# Patient Record
Sex: FEMALE | Race: BLACK OR AFRICAN AMERICAN | NOT HISPANIC OR LATINO | Employment: UNEMPLOYED | URBAN - METROPOLITAN AREA
[De-identification: names, ages, dates, MRNs, and addresses within clinical notes are randomized per-mention and may not be internally consistent; named-entity substitution may affect disease eponyms.]

---

## 2019-05-06 ENCOUNTER — HOSPITAL ENCOUNTER (OUTPATIENT)
Dept: RADIOLOGY | Facility: HOSPITAL | Age: 44
Discharge: HOME | End: 2019-05-06
Attending: FAMILY MEDICINE
Payer: COMMERCIAL

## 2019-05-06 ENCOUNTER — OFFICE VISIT (OUTPATIENT)
Dept: FAMILY MEDICINE | Facility: CLINIC | Age: 44
End: 2019-05-06
Payer: COMMERCIAL

## 2019-05-06 VITALS
DIASTOLIC BLOOD PRESSURE: 115 MMHG | WEIGHT: 254.44 LBS | TEMPERATURE: 99 F | BODY MASS INDEX: 39.93 KG/M2 | HEART RATE: 97 BPM | RESPIRATION RATE: 16 BRPM | SYSTOLIC BLOOD PRESSURE: 170 MMHG | OXYGEN SATURATION: 98 % | HEIGHT: 67 IN

## 2019-05-06 DIAGNOSIS — E66.9 OBESITY (BMI 30-39.9): ICD-10-CM

## 2019-05-06 DIAGNOSIS — R06.02 SHORTNESS OF BREATH: ICD-10-CM

## 2019-05-06 DIAGNOSIS — E04.2 MULTINODULAR GOITER: ICD-10-CM

## 2019-05-06 DIAGNOSIS — I10 HYPERTENSION, UNSPECIFIED TYPE: ICD-10-CM

## 2019-05-06 PROCEDURE — 3077F SYST BP >= 140 MM HG: CPT | Mod: CPTII,S$GLB,, | Performed by: FAMILY MEDICINE

## 2019-05-06 PROCEDURE — 3008F PR BODY MASS INDEX (BMI) DOCUMENTED: ICD-10-PCS | Mod: CPTII,S$GLB,, | Performed by: FAMILY MEDICINE

## 2019-05-06 PROCEDURE — 99204 OFFICE O/P NEW MOD 45 MIN: CPT | Mod: S$GLB,,, | Performed by: FAMILY MEDICINE

## 2019-05-06 PROCEDURE — 99999 PR PBB SHADOW E&M-NEW PATIENT-LVL IV: ICD-10-PCS | Mod: PBBFAC,,, | Performed by: FAMILY MEDICINE

## 2019-05-06 PROCEDURE — 3077F PR MOST RECENT SYSTOLIC BLOOD PRESSURE >= 140 MM HG: ICD-10-PCS | Mod: CPTII,S$GLB,, | Performed by: FAMILY MEDICINE

## 2019-05-06 PROCEDURE — 71046 X-RAY EXAM CHEST 2 VIEWS: CPT | Mod: 26,,, | Performed by: RADIOLOGY

## 2019-05-06 PROCEDURE — 99999 PR PBB SHADOW E&M-NEW PATIENT-LVL IV: CPT | Mod: PBBFAC,,, | Performed by: FAMILY MEDICINE

## 2019-05-06 PROCEDURE — 3008F BODY MASS INDEX DOCD: CPT | Mod: CPTII,S$GLB,, | Performed by: FAMILY MEDICINE

## 2019-05-06 PROCEDURE — 71046 X-RAY EXAM CHEST 2 VIEWS: CPT | Mod: TC,FY,PO

## 2019-05-06 PROCEDURE — 99204 PR OFFICE/OUTPT VISIT, NEW, LEVL IV, 45-59 MIN: ICD-10-PCS | Mod: S$GLB,,, | Performed by: FAMILY MEDICINE

## 2019-05-06 PROCEDURE — 71046 XR CHEST PA AND LATERAL: ICD-10-PCS | Mod: 26,,, | Performed by: RADIOLOGY

## 2019-05-06 PROCEDURE — 3080F PR MOST RECENT DIASTOLIC BLOOD PRESSURE >= 90 MM HG: ICD-10-PCS | Mod: CPTII,S$GLB,, | Performed by: FAMILY MEDICINE

## 2019-05-06 PROCEDURE — 3080F DIAST BP >= 90 MM HG: CPT | Mod: CPTII,S$GLB,, | Performed by: FAMILY MEDICINE

## 2019-05-06 RX ORDER — AMLODIPINE BESYLATE 5 MG/1
5 TABLET ORAL DAILY
Qty: 30 TABLET | Refills: 5 | Status: SHIPPED | OUTPATIENT
Start: 2019-05-06 | End: 2019-10-18 | Stop reason: SDUPTHER

## 2019-05-06 NOTE — PROGRESS NOTES
Arielle Paige    Chief Complaint   Patient presents with    [facility]    Mass     on neck       History of Present Illness:   Ms. Paige comes in today as a new patient to establish care with me and for follow-up of lump in her neck.      She saw Dr. Swain, endocrinologist with [facility], on April 23, 2019 for evaluation of lump in her neck at which time she had labs and thyroid ultrasound followed by follow-up visit with him on May 3, 2019 at which time he recommended thyroid removal.  She has been referred to general surgeon Dr. Garcia, whom she states she will see tomorrow.      She states prior to spring break she had flu like symptoms which resolved except reports having slight shortness of breath with activity with residual cough.  She reports possible catch with swallowing.    She reports no history of hypertension.  Her blood pressure at her initial visit with Dr. Swain was 150/90 with blood pressure of 120/80 at follow-up visit.  She states she does not add salt with cooking or with eating.  She states she exercises at [place].  She reports having slight headache without headache today.     Otherwise, she denies having numbness, acute visual changes; chest pain, palpitations, leg swelling; wheezing; abdominal pain, nausea, vomiting, diarrhea, constipation; hot or cold intolerance; fever, chills, fatigue, appetite changes; unusual urinary symptoms; back pain; anxiety, depression, homicidal or suicidal thoughts.    Ultrasound, thyroid - April 29, 2019 - enlarged multinodular thyroid gland.  Right lobe measures 7.2 x 3.9 x 4.4 cm.  Left lobe measures 6.5 x 1.7 x 2.4 cm.  There are numerous solid heterogeneous hyperechoic and isoechoic nodules scattered throughout the bilateral thyroid gland, several of which demonstrate small internal cysts.  The largest nodule occupying the mid and lower right thyroid lobe measures 6.0 x 5.6 x 3.3 cm.  Another dominant nodule in the superior  right lobe measures 4.2 cm.  The largest left thyroid nodule in the inferior pole measures 2.5 x 1.5 cm.  The remaining left thyroid nodules range in size from 1.0 cm to 1.6 cm.    Labs (drawn on April 23, 2019):  TSH 1.86, total cholesterol 185, triglycerides 69, HDL 51, , WBC 7.9, hemoglobin 13.1, hematocrit 40.0, platelet count [id number], thyroid peroxidase antibody 19.2, ALT 18, AST 13, glucose 94, BUN 10, creatinine 0.74, total bilirubin 0.3, alkaline phosphatase 61, calcium 9.8, sodium 140, potassium 4.7, chloride 105, CO2 26, total protein 6.5.      Current Outpatient Medications   Medication Sig    norethindrone-e.estradiol-iron (LO LOESTRIN FE) 1 mg-10 mcg (24)/10 mcg (2) Tab Take 1 tablet by mouth.       Review of Systems   Constitutional: Negative for appetite change, chills, fatigue and fever.   HENT: Negative for trouble swallowing.    Respiratory: Positive for shortness of breath. Negative for cough and wheezing.    Cardiovascular: Negative for chest pain, palpitations and leg swelling.   Gastrointestinal: Negative for abdominal pain, constipation, diarrhea, nausea and vomiting.   Endocrine: Negative for cold intolerance and heat intolerance.   Genitourinary: Negative for difficulty urinating.   Musculoskeletal: Negative for back pain.   Neurological: Positive for headaches.   Psychiatric/Behavioral: Negative for dysphoric mood and suicidal ideas. The patient is not nervous/anxious.        Objective:  Physical Exam   Constitutional: She is oriented to person, place, and time. She appears well-developed and well-nourished. No distress.   Pleasant.   Eyes:   She wears glasses.   Neck: Normal range of motion. Neck supple. Thyromegaly present.   Non tender right > left.   Cardiovascular: Normal rate, regular rhythm, normal heart sounds and intact distal pulses.   No murmur heard.  Pulmonary/Chest: Effort normal and breath sounds normal. No stridor. No respiratory distress. She has no wheezes.    Abdominal: Soft. Bowel sounds are normal. She exhibits no distension and no mass. There is no tenderness. There is no guarding.   Musculoskeletal: Normal range of motion. She exhibits no edema or tenderness.   She is ambulatory without problems.   Lymphadenopathy:     She has no cervical adenopathy.   Neurological: She is alert and oriented to person, place, and time.   Skin: She is not diaphoretic.   Psychiatric: She has a normal mood and affect. Her behavior is normal. Judgment and thought content normal.   Vitals reviewed.      ASSESSMENT:  1. Hypertension, unspecified type    2. Shortness of breath    3. Multinodular goiter    4. Obesity (BMI 30-39.9)        PLAN:  Arielle was seen today for [facility] care and mass.    Diagnoses and all orders for this visit:    Hypertension, unspecified type  -     amLODIPine (NORVASC) 5 MG tablet; Take 1 tablet (5 mg total) by mouth once daily.    Shortness of breath  -     X-Ray Chest PA And Lateral; Future    Multinodular goiter    Obesity (BMI 30-39.9)       Patient advised to call for results.  Add Amlodipine 5 mg daily; medication precautions discussed with patient.  Continue current medications, follow low sodium, low cholesterol, low carb diet, daily walks.  Perform home blood pressure monitoring once week and keep record for my review (blood pressure goal < 140/90).  Keep follow up with specialists.  Follow up in about 8 weeks (around 7/1/2019) for hypertension follow up.

## 2019-05-13 PROBLEM — I10 HYPERTENSION: Status: ACTIVE | Noted: 2019-05-13

## 2019-05-13 PROBLEM — E04.2 MULTINODULAR GOITER: Status: ACTIVE | Noted: 2019-05-13

## 2019-07-01 ENCOUNTER — OFFICE VISIT (OUTPATIENT)
Dept: FAMILY MEDICINE | Facility: CLINIC | Age: 44
End: 2019-07-01
Payer: COMMERCIAL

## 2019-07-01 VITALS
OXYGEN SATURATION: 98 % | WEIGHT: 253.5 LBS | DIASTOLIC BLOOD PRESSURE: 90 MMHG | BODY MASS INDEX: 39.79 KG/M2 | TEMPERATURE: 98 F | SYSTOLIC BLOOD PRESSURE: 130 MMHG | HEIGHT: 67 IN | HEART RATE: 83 BPM

## 2019-07-01 DIAGNOSIS — I10 HYPERTENSION, UNSPECIFIED TYPE: Primary | ICD-10-CM

## 2019-07-01 DIAGNOSIS — E89.0 S/P THYROIDECTOMY: ICD-10-CM

## 2019-07-01 DIAGNOSIS — E66.9 OBESITY (BMI 30-39.9): ICD-10-CM

## 2019-07-01 PROBLEM — Z98.890 S/P THYROIDECTOMY: Status: ACTIVE | Noted: 2019-07-01

## 2019-07-01 PROBLEM — Z90.89 S/P THYROIDECTOMY: Status: ACTIVE | Noted: 2019-07-01

## 2019-07-01 PROCEDURE — 3008F BODY MASS INDEX DOCD: CPT | Mod: CPTII,S$GLB,, | Performed by: FAMILY MEDICINE

## 2019-07-01 PROCEDURE — 3075F SYST BP GE 130 - 139MM HG: CPT | Mod: CPTII,S$GLB,, | Performed by: FAMILY MEDICINE

## 2019-07-01 PROCEDURE — 99214 PR OFFICE/OUTPT VISIT, EST, LEVL IV, 30-39 MIN: ICD-10-PCS | Mod: S$GLB,,, | Performed by: FAMILY MEDICINE

## 2019-07-01 PROCEDURE — 3080F PR MOST RECENT DIASTOLIC BLOOD PRESSURE >= 90 MM HG: ICD-10-PCS | Mod: CPTII,S$GLB,, | Performed by: FAMILY MEDICINE

## 2019-07-01 PROCEDURE — 3080F DIAST BP >= 90 MM HG: CPT | Mod: CPTII,S$GLB,, | Performed by: FAMILY MEDICINE

## 2019-07-01 PROCEDURE — 99999 PR PBB SHADOW E&M-EST. PATIENT-LVL IV: CPT | Mod: PBBFAC,,, | Performed by: FAMILY MEDICINE

## 2019-07-01 PROCEDURE — 99214 OFFICE O/P EST MOD 30 MIN: CPT | Mod: S$GLB,,, | Performed by: FAMILY MEDICINE

## 2019-07-01 PROCEDURE — 99999 PR PBB SHADOW E&M-EST. PATIENT-LVL IV: ICD-10-PCS | Mod: PBBFAC,,, | Performed by: FAMILY MEDICINE

## 2019-07-01 PROCEDURE — 3075F PR MOST RECENT SYSTOLIC BLOOD PRESS GE 130-139MM HG: ICD-10-PCS | Mod: CPTII,S$GLB,, | Performed by: FAMILY MEDICINE

## 2019-07-01 PROCEDURE — 3008F PR BODY MASS INDEX (BMI) DOCUMENTED: ICD-10-PCS | Mod: CPTII,S$GLB,, | Performed by: FAMILY MEDICINE

## 2019-07-01 RX ORDER — HYDROCHLOROTHIAZIDE 12.5 MG/1
12.5 TABLET ORAL DAILY
Qty: 30 TABLET | Refills: 5 | Status: SHIPPED | OUTPATIENT
Start: 2019-07-01 | End: 2019-12-17 | Stop reason: SDUPTHER

## 2019-07-01 RX ORDER — LEVOTHYROXINE SODIUM 150 UG/1
150 TABLET ORAL DAILY
COMMUNITY
End: 2020-01-02 | Stop reason: DRUGHIGH

## 2019-07-01 NOTE — PROGRESS NOTES
Arielle Paige    Chief Complaint   Patient presents with    Hypertension    Follow-up       History of Present Illness:   Ms. Paige comes in today for hypertension follow-up.  She has taken medication today.  She states she monitors her diet but has not started exercise following thyroidectomy performed on Arelis 15, 2019 for treatment of multinodular goiter.  She reports no problems with amlodipine 5 mg daily prescribed on May 6, 2019 for treatment of hypertension.  She reports home blood pressure levels ranged 132/98.    As stated above, she is status post total thyroidectomy for treatment of multinodular goiter.  She saw Dr. Reji Garcia, general surgeon, for postsurgical follow-up on June 24, 2019 and is scheduled for follow up on July 9, 2019.  She states he told her to take Tums as she states she was told 1 of her parathyroid glands was also removed.  She states she continues to have slight tenderness at the surgical area at her neck but denies having pain with swallowing or sore throat.  She follows with Dr. Swain, endocrinologist, for surgical hypothyroidism surveillance and is currently on Synthroid 150 mcg daily.  She saw him on May 2, 2019 and is scheduled for follow-up with labs with Dr. Swain on July 16, 2019.    Otherwise, she denies having fever, chills, fatigue, appetite changes; shortness of breath, cough, wheezing; chest pain, palpitations, leg swelling; abdominal pain, nausea, vomiting, diarrhea, constipation; unusual urinary symptoms; back pain; headache; anxiety, depression, homicidal or suicidal thoughts.      Current Outpatient Medications   Medication Sig    amLODIPine (NORVASC) 5 MG tablet Take 1 tablet (5 mg total) by mouth once daily.    levothyroxine (SYNTHROID) 150 MCG tablet Take 150 mcg by mouth once daily.    norethindrone-e.estradiol-iron (LO LOESTRIN FE) 1 mg-10 mcg (24)/10 mcg (2) Tab Take 1 tablet by mouth.       Review of Systems   Constitutional: Negative for  appetite change, chills, fatigue and fever.        Weight 115.4 kg (254 lb 6.6 oz) at May 6, 2019 visit.   HENT: Negative for trouble swallowing.    Respiratory: Negative for cough, shortness of breath and wheezing.    Cardiovascular: Negative for chest pain, palpitations and leg swelling.   Gastrointestinal: Negative for abdominal pain, constipation, diarrhea, nausea and vomiting.   Endocrine: Negative for cold intolerance and heat intolerance.        See history of present illness.   Genitourinary: Negative for difficulty urinating.   Musculoskeletal: Negative for back pain and neck pain.   Neurological: Negative for headaches.   Psychiatric/Behavioral: Negative for dysphoric mood and suicidal ideas. The patient is not nervous/anxious.        Objective:  Physical Exam   Constitutional: She is oriented to person, place, and time. She appears well-developed and well-nourished. No distress.   Pleasant.   Eyes:   She wears glasses.   Neck: Normal range of motion. Neck supple. No thyromegaly present.   Thyroid absent.   Cardiovascular: Normal rate, regular rhythm, normal heart sounds and intact distal pulses.   No murmur heard.  Pulmonary/Chest: Effort normal and breath sounds normal. No stridor. No respiratory distress. She has no wheezes.   Abdominal: Soft. Bowel sounds are normal. She exhibits no distension and no mass. There is no tenderness. There is no guarding.   Musculoskeletal: Normal range of motion. She exhibits no edema or tenderness.   She is ambulatory without problems.   Lymphadenopathy:     She has no cervical adenopathy.   Neurological: She is alert and oriented to person, place, and time.   Skin: She is not diaphoretic.   Healing wound at anterior neck.   Psychiatric: She has a normal mood and affect. Her behavior is normal. Judgment and thought content normal.   Vitals reviewed.      ASSESSMENT:  1. Hypertension, unspecified type    2. S/P thyroidectomy    3. Obesity (BMI 30-39.9)         PLAN:  Arielle was seen today for hypertension and follow-up.    Diagnoses and all orders for this visit:    Hypertension, unspecified type  -     hydroCHLOROthiazide (HYDRODIURIL) 12.5 MG Tab; Take 1 tablet (12.5 mg total) by mouth once daily.    S/P thyroidectomy    Obesity (BMI 30-39.9)       Patient advised to call for results.  Add HCTZ 12.5 mg daily for better blood pressure control; medication precautions discussed with patient.  Continue current medications, follow low sodium, low cholesterol, low carb diet, daily walks.  Keep follow up with specialists.  Follow up in about 6 months (around 1/2/2020) for hypertension follow up.

## 2019-09-17 ENCOUNTER — TELEPHONE (OUTPATIENT)
Dept: FAMILY MEDICINE | Facility: CLINIC | Age: 44
End: 2019-09-17

## 2019-09-17 DIAGNOSIS — Z23 NEED FOR TDAP VACCINATION: Primary | ICD-10-CM

## 2019-09-17 DIAGNOSIS — Z23 NEED FOR INFLUENZA VACCINATION: ICD-10-CM

## 2019-09-17 DIAGNOSIS — Z11.1 SCREENING-PULMONARY TB: Primary | ICD-10-CM

## 2019-09-17 DIAGNOSIS — Z11.59 SCREENING FOR VIRAL DISEASE: ICD-10-CM

## 2019-09-17 DIAGNOSIS — Z11.59 SCREENING EXAMINATION FOR RUBELLA: ICD-10-CM

## 2019-09-17 DIAGNOSIS — Z11.59 MEASLES SCREENING: ICD-10-CM

## 2019-09-17 NOTE — TELEPHONE ENCOUNTER
I spoke w/pt - needs flu shot, 2-step TB skin testing and labs (titers-MMR, Tdap) for work.  Please schedule nurse visit for tomorrow 9/18/19 - [facility] - 8-830 nurse visit for flu shot, TB skin test, then 2nd TB skin test on 9/25/19-[facility] 8-830 as nurse visit with reading on 9/27/19 - FRI as nurse visit. Patient aware. Thanks.

## 2019-09-18 ENCOUNTER — CLINICAL SUPPORT (OUTPATIENT)
Dept: FAMILY MEDICINE | Facility: CLINIC | Age: 44
End: 2019-09-18
Payer: COMMERCIAL

## 2019-09-18 ENCOUNTER — LAB VISIT (OUTPATIENT)
Dept: LAB | Facility: HOSPITAL | Age: 44
End: 2019-09-18
Attending: FAMILY MEDICINE
Payer: COMMERCIAL

## 2019-09-18 DIAGNOSIS — Z11.59 MEASLES SCREENING: ICD-10-CM

## 2019-09-18 DIAGNOSIS — Z11.59 SCREENING EXAMINATION FOR RUBELLA: ICD-10-CM

## 2019-09-18 DIAGNOSIS — Z11.59 SCREENING FOR VIRAL DISEASE: ICD-10-CM

## 2019-09-18 PROCEDURE — 90471 FLU VACCINE (QUAD) GREATER THAN OR EQUAL TO 3YO PRESERVATIVE FREE IM: ICD-10-PCS | Mod: S$GLB,,, | Performed by: FAMILY MEDICINE

## 2019-09-18 PROCEDURE — 90471 IMMUNIZATION ADMIN: CPT | Mod: S$GLB,,, | Performed by: FAMILY MEDICINE

## 2019-09-18 PROCEDURE — 90715 TDAP VACCINE 7 YRS/> IM: CPT | Mod: S$GLB,,, | Performed by: FAMILY MEDICINE

## 2019-09-18 PROCEDURE — 90715 TDAP VACCINE GREATER THAN OR EQUAL TO 7YO IM: ICD-10-PCS | Mod: S$GLB,,, | Performed by: FAMILY MEDICINE

## 2019-09-18 PROCEDURE — 90686 IIV4 VACC NO PRSV 0.5 ML IM: CPT | Mod: S$GLB,,, | Performed by: FAMILY MEDICINE

## 2019-09-18 PROCEDURE — 86765 RUBEOLA ANTIBODY: CPT

## 2019-09-18 PROCEDURE — 99999 PR PBB SHADOW E&M-EST. PATIENT-LVL I: CPT | Mod: PBBFAC,,,

## 2019-09-18 PROCEDURE — 90472 TDAP VACCINE GREATER THAN OR EQUAL TO 7YO IM: ICD-10-PCS | Mod: S$GLB,,, | Performed by: FAMILY MEDICINE

## 2019-09-18 PROCEDURE — 86762 RUBELLA ANTIBODY: CPT

## 2019-09-18 PROCEDURE — 86735 MUMPS ANTIBODY: CPT

## 2019-09-18 PROCEDURE — 86580 POCT TB SKIN TEST: ICD-10-PCS | Mod: S$GLB,,, | Performed by: FAMILY MEDICINE

## 2019-09-18 PROCEDURE — 86580 TB INTRADERMAL TEST: CPT | Mod: S$GLB,,, | Performed by: FAMILY MEDICINE

## 2019-09-18 PROCEDURE — 99999 PR PBB SHADOW E&M-EST. PATIENT-LVL I: ICD-10-PCS | Mod: PBBFAC,,,

## 2019-09-18 PROCEDURE — 90472 IMMUNIZATION ADMIN EACH ADD: CPT | Mod: S$GLB,,, | Performed by: FAMILY MEDICINE

## 2019-09-18 PROCEDURE — 90686 FLU VACCINE (QUAD) GREATER THAN OR EQUAL TO 3YO PRESERVATIVE FREE IM: ICD-10-PCS | Mod: S$GLB,,, | Performed by: FAMILY MEDICINE

## 2019-09-18 PROCEDURE — 36415 COLL VENOUS BLD VENIPUNCTURE: CPT | Mod: PO

## 2019-09-19 LAB
RUBV IGG SER-ACNC: 15.6 IU/ML
RUBV IGG SER-IMP: REACTIVE

## 2019-09-19 NOTE — PROGRESS NOTES
Pt connor inj well  Tb skin test to r arm   Pt will return for reading on Friday evening  Verbalized understanding

## 2019-09-20 ENCOUNTER — CLINICAL SUPPORT (OUTPATIENT)
Dept: FAMILY MEDICINE | Facility: CLINIC | Age: 44
End: 2019-09-20
Payer: COMMERCIAL

## 2019-09-20 LAB
TB INDURATION - 48 HR READ: 0 MM
TB INDURATION - 72 HR READ: NORMAL MM
TB SKIN TEST - 48 HR READ: NEGATIVE
TB SKIN TEST - 72 HR READ: NORMAL

## 2019-09-23 LAB
MUMPS IGG INTERPRETATION: POSITIVE
MUMPS IGG SCREEN: 1.88 ISR (ref 0–0.9)
RUBEOLA IGG ANTIBODY: 2.12 ISR (ref 0–0.9)
RUBEOLA INTERPRETATION: POSITIVE

## 2019-09-30 ENCOUNTER — CLINICAL SUPPORT (OUTPATIENT)
Dept: FAMILY MEDICINE | Facility: CLINIC | Age: 44
End: 2019-09-30
Payer: COMMERCIAL

## 2019-09-30 DIAGNOSIS — Z11.1 SCREENING-PULMONARY TB: Primary | ICD-10-CM

## 2019-09-30 PROCEDURE — 86580 POCT TB SKIN TEST: ICD-10-PCS | Mod: S$GLB,,, | Performed by: FAMILY MEDICINE

## 2019-09-30 PROCEDURE — 99999 PR PBB SHADOW E&M-EST. PATIENT-LVL I: CPT | Mod: PBBFAC,,,

## 2019-09-30 PROCEDURE — 99999 PR PBB SHADOW E&M-EST. PATIENT-LVL I: ICD-10-PCS | Mod: PBBFAC,,,

## 2019-09-30 PROCEDURE — 86580 TB INTRADERMAL TEST: CPT | Mod: S$GLB,,, | Performed by: FAMILY MEDICINE

## 2019-09-30 NOTE — TELEPHONE ENCOUNTER
Order in. For clarification: needs PPD today with reading on this Wednesday; if  (-), then PPD placement again next Monday with reading the following Wednesday. Thanks.

## 2019-10-02 ENCOUNTER — CLINICAL SUPPORT (OUTPATIENT)
Dept: FAMILY MEDICINE | Facility: CLINIC | Age: 44
End: 2019-10-02
Payer: COMMERCIAL

## 2019-10-02 PROCEDURE — 99999 PR PBB SHADOW E&M-EST. PATIENT-LVL I: ICD-10-PCS | Mod: PBBFAC,,,

## 2019-10-02 PROCEDURE — 99999 PR PBB SHADOW E&M-EST. PATIENT-LVL I: CPT | Mod: PBBFAC,,,

## 2019-10-02 NOTE — PROGRESS NOTES
PPD Test read negative on right forearm. Patient scheduled for nurse visit for 2nd ppd test on Monday/vlw

## 2019-10-07 ENCOUNTER — CLINICAL SUPPORT (OUTPATIENT)
Dept: FAMILY MEDICINE | Facility: CLINIC | Age: 44
End: 2019-10-07
Payer: COMMERCIAL

## 2019-10-07 DIAGNOSIS — Z11.1 SCREENING-PULMONARY TB: Primary | ICD-10-CM

## 2019-10-07 PROCEDURE — 86580 TB INTRADERMAL TEST: CPT | Mod: S$GLB,,, | Performed by: FAMILY MEDICINE

## 2019-10-07 PROCEDURE — 99999 PR PBB SHADOW E&M-EST. PATIENT-LVL I: ICD-10-PCS | Mod: PBBFAC,,,

## 2019-10-07 PROCEDURE — 86580 PR  TB INTRADERMAL TEST: ICD-10-PCS | Mod: S$GLB,,, | Performed by: FAMILY MEDICINE

## 2019-10-07 PROCEDURE — 99999 PR PBB SHADOW E&M-EST. PATIENT-LVL I: CPT | Mod: PBBFAC,,,

## 2019-10-07 NOTE — PROGRESS NOTES
PPD adminstered as ordered by   Patient infromed PPD today with reading on this Wednesday at 9:01am or anytime during this day; Patient voiced understanding

## 2019-10-09 ENCOUNTER — CLINICAL SUPPORT (OUTPATIENT)
Dept: FAMILY MEDICINE | Facility: CLINIC | Age: 44
End: 2019-10-09
Payer: COMMERCIAL

## 2019-10-09 DIAGNOSIS — Z11.1 SCREENING-PULMONARY TB: Primary | ICD-10-CM

## 2019-10-09 LAB
TB INDURATION - 48 HR READ: 0 MM
TB INDURATION - 72 HR READ: NORMAL
TB SKIN TEST - 48 HR READ: NEGATIVE
TB SKIN TEST - 72 HR READ: NORMAL

## 2019-10-18 DIAGNOSIS — I10 HYPERTENSION, UNSPECIFIED TYPE: ICD-10-CM

## 2019-10-18 RX ORDER — AMLODIPINE BESYLATE 5 MG/1
TABLET ORAL
Qty: 30 TABLET | Refills: 5 | Status: SHIPPED | OUTPATIENT
Start: 2019-10-18 | End: 2020-01-02 | Stop reason: SDUPTHER

## 2019-12-17 DIAGNOSIS — I10 HYPERTENSION, UNSPECIFIED TYPE: ICD-10-CM

## 2019-12-17 RX ORDER — HYDROCHLOROTHIAZIDE 12.5 MG/1
TABLET ORAL
Qty: 30 TABLET | Refills: 5 | Status: SHIPPED | OUTPATIENT
Start: 2019-12-17 | End: 2020-01-02 | Stop reason: SDUPTHER

## 2020-01-02 ENCOUNTER — OFFICE VISIT (OUTPATIENT)
Dept: FAMILY MEDICINE | Facility: CLINIC | Age: 45
End: 2020-01-02
Payer: COMMERCIAL

## 2020-01-02 VITALS
TEMPERATURE: 98 F | OXYGEN SATURATION: 97 % | HEART RATE: 90 BPM | BODY MASS INDEX: 43.23 KG/M2 | SYSTOLIC BLOOD PRESSURE: 130 MMHG | WEIGHT: 275.44 LBS | HEIGHT: 67 IN | DIASTOLIC BLOOD PRESSURE: 80 MMHG

## 2020-01-02 DIAGNOSIS — E89.0 S/P THYROIDECTOMY: ICD-10-CM

## 2020-01-02 DIAGNOSIS — M79.646 PAIN OF FINGER, UNSPECIFIED LATERALITY: ICD-10-CM

## 2020-01-02 DIAGNOSIS — I10 HYPERTENSION, UNSPECIFIED TYPE: ICD-10-CM

## 2020-01-02 DIAGNOSIS — E66.01 MORBIDLY OBESE: ICD-10-CM

## 2020-01-02 PROBLEM — E66.9 OBESITY (BMI 30-39.9): Status: RESOLVED | Noted: 2019-07-01 | Resolved: 2020-01-02

## 2020-01-02 PROCEDURE — 99999 PR PBB SHADOW E&M-EST. PATIENT-LVL III: CPT | Mod: PBBFAC,,, | Performed by: FAMILY MEDICINE

## 2020-01-02 PROCEDURE — 3079F DIAST BP 80-89 MM HG: CPT | Mod: CPTII,S$GLB,, | Performed by: FAMILY MEDICINE

## 2020-01-02 PROCEDURE — 3075F PR MOST RECENT SYSTOLIC BLOOD PRESS GE 130-139MM HG: ICD-10-PCS | Mod: CPTII,S$GLB,, | Performed by: FAMILY MEDICINE

## 2020-01-02 PROCEDURE — 3075F SYST BP GE 130 - 139MM HG: CPT | Mod: CPTII,S$GLB,, | Performed by: FAMILY MEDICINE

## 2020-01-02 PROCEDURE — 3008F BODY MASS INDEX DOCD: CPT | Mod: CPTII,S$GLB,, | Performed by: FAMILY MEDICINE

## 2020-01-02 PROCEDURE — 99214 OFFICE O/P EST MOD 30 MIN: CPT | Mod: S$GLB,,, | Performed by: FAMILY MEDICINE

## 2020-01-02 PROCEDURE — 99214 PR OFFICE/OUTPT VISIT, EST, LEVL IV, 30-39 MIN: ICD-10-PCS | Mod: S$GLB,,, | Performed by: FAMILY MEDICINE

## 2020-01-02 PROCEDURE — 3008F PR BODY MASS INDEX (BMI) DOCUMENTED: ICD-10-PCS | Mod: CPTII,S$GLB,, | Performed by: FAMILY MEDICINE

## 2020-01-02 PROCEDURE — 3079F PR MOST RECENT DIASTOLIC BLOOD PRESSURE 80-89 MM HG: ICD-10-PCS | Mod: CPTII,S$GLB,, | Performed by: FAMILY MEDICINE

## 2020-01-02 PROCEDURE — 99999 PR PBB SHADOW E&M-EST. PATIENT-LVL III: ICD-10-PCS | Mod: PBBFAC,,, | Performed by: FAMILY MEDICINE

## 2020-01-02 RX ORDER — LEVOTHYROXINE SODIUM 125 UG/1
125 TABLET ORAL DAILY
COMMUNITY
End: 2021-01-12

## 2020-01-02 RX ORDER — AMLODIPINE BESYLATE 5 MG/1
5 TABLET ORAL DAILY
Qty: 30 TABLET | Refills: 5 | Status: SHIPPED | OUTPATIENT
Start: 2020-01-02 | End: 2020-07-06 | Stop reason: SDUPTHER

## 2020-01-02 RX ORDER — HYDROCHLOROTHIAZIDE 12.5 MG/1
12.5 TABLET ORAL DAILY
Qty: 30 TABLET | Refills: 5 | Status: SHIPPED | OUTPATIENT
Start: 2020-01-02 | End: 2020-04-11 | Stop reason: SDUPTHER

## 2020-01-02 NOTE — PROGRESS NOTES
Arielle Paige    Chief Complaint   Patient presents with    Hypertension    Follow-up       History of Present Illness:   Ms. Paige comes in today for hypertension follow-up.  She has taken medication and is fasting today.  She states she monitors her diet and usually exercises.  She reports no problems with taking blood pressure medications and states home blood pressure levels are good.    She states some time ago feeling possible palpitations and thinks she may have been anxious at the time but denies having other associated symptoms during that time.  She states she drinks very little caffeine.  She states she has not experienced palpitations in at  least 2 weeks.    She is status post total thyroidectomy for treatment of multinodular goiter.  She saw Dr. Reji Garcia, general surgeon, for postsurgical follow-up on uly 9, 2019.  She follows with Dr. Swain, endocrinologist, for surgical hypothyroidism surveillance and is currently on Synthroid 125 mcg daily.  She saw him on July 16, 2019 and is scheduled to see him for follow up on January 15. 2020.    She is right handed.  She states some times she has pain with occasional swelling of her right index (PIP > DIP) joints and at left wrist without associated trauma, warmth, redness or decreased range of motion x several months.  She states she does not take or require pain medication.      Otherwise, she denies having fever, chills, fatigue, appetite changes; shortness of breath, cough, wheezing; chest pain, leg swelling; abdominal pain, nausea, vomiting, diarrhea, constipation; unusual urinary symptoms; back pain; headache; anxiety, depression, homicidal or suicidal thoughts; hot or cold intolerance.            Current Outpatient Medications   Medication Sig    amLODIPine (NORVASC) 5 MG tablet TAKE 1 TABLET BY MOUTH EVERY DAY    hydroCHLOROthiazide (HYDRODIURIL) 12.5 MG Tab TAKE 1 TABLET BY MOUTH EVERY DAY    levothyroxine (SYNTHROID) 125 MCG  tablet Take 125 mcg by mouth once daily.    norethindrone-e.estradiol-iron (LO LOESTRIN FE) 1 mg-10 mcg (24)/10 mcg (2) Tab Take 1 tablet by mouth.       Review of Systems   Constitutional: Negative for activity change, appetite change, chills, fatigue and fever.        Weight 115 kg (253 lb 8.5 oz) at July 1, 2019 visit.   Respiratory: Negative for cough, shortness of breath and wheezing.    Cardiovascular: Positive for palpitations. Negative for chest pain and leg swelling.   Gastrointestinal: Negative for abdominal pain, constipation, diarrhea, nausea and vomiting.   Endocrine: Negative for cold intolerance and heat intolerance.        See history of present illness.   Genitourinary: Negative for difficulty urinating.   Musculoskeletal: Positive for joint swelling and myalgias. Negative for back pain.   Neurological: Negative for headaches.   Psychiatric/Behavioral: Negative for dysphoric mood and suicidal ideas. The patient is not nervous/anxious.        Objective:  Physical Exam   Constitutional: She is oriented to person, place, and time. She appears well-developed and well-nourished. No distress.   Pleasant.   Eyes:   She wears glasses.   Neck: Normal range of motion. Neck supple. No thyromegaly present.   Thyroid absent.   Cardiovascular: Normal rate, regular rhythm, normal heart sounds and intact distal pulses.   No murmur heard.  Pulmonary/Chest: Effort normal and breath sounds normal. No stridor. No respiratory distress. She has no wheezes.   Abdominal: Soft. Bowel sounds are normal. She exhibits no distension and no mass. There is no tenderness. There is no guarding.   Musculoskeletal: Normal range of motion. She exhibits tenderness. She exhibits no edema.   She is ambulatory without problems. Slightly tender at right index finger - PIP joint - without swelling, warmth, redness or decreased range of motion noted.  Non tender right wrist with full range of motion noted.   Lymphadenopathy:     She has no  cervical adenopathy.   Neurological: She is alert and oriented to person, place, and time.   Skin: She is not diaphoretic.   Psychiatric: She has a normal mood and affect. Her behavior is normal. Judgment and thought content normal.   Vitals reviewed.      ASSESSMENT:  1. Hypertension, unspecified type    2. S/P thyroidectomy    3. Pain of finger, unspecified laterality    4. Morbidly obese        PLAN:  Arielle was seen today for hypertension and follow-up.    Diagnoses and all orders for this visit:    Hypertension, unspecified type  -     hydroCHLOROthiazide (HYDRODIURIL) 12.5 MG Tab; Take 1 tablet (12.5 mg total) by mouth once daily.  -     amLODIPine (NORVASC) 5 MG tablet; Take 1 tablet (5 mg total) by mouth once daily.    S/P thyroidectomy    Pain of finger, unspecified laterality    Morbidly obese       Patient advised to call for results.  Continue current medications, follow low sodium, low cholesterol, low carb diet, daily walks.  Prescription refills as noted above.  Advised patient to closely monitor joint pain but may take OTC ES Tylenol and/or OTC NSAID for pain as directed if needed.  Advised patient to closely monitor palpitations and notify if persists as will need holter monitor for further evaluation.  Keep follow up with specialists.  Follow up in about 6 months (around 7/2/2020) for hypertension follow up.

## 2020-03-18 ENCOUNTER — OFFICE VISIT (OUTPATIENT)
Dept: FAMILY MEDICINE | Facility: CLINIC | Age: 45
End: 2020-03-18
Payer: COMMERCIAL

## 2020-03-18 VITALS
DIASTOLIC BLOOD PRESSURE: 90 MMHG | RESPIRATION RATE: 18 BRPM | BODY MASS INDEX: 42.11 KG/M2 | WEIGHT: 268.31 LBS | SYSTOLIC BLOOD PRESSURE: 146 MMHG | TEMPERATURE: 99 F | OXYGEN SATURATION: 96 % | HEART RATE: 95 BPM | HEIGHT: 67 IN

## 2020-03-18 DIAGNOSIS — I10 HYPERTENSION, UNSPECIFIED TYPE: Primary | ICD-10-CM

## 2020-03-18 DIAGNOSIS — R07.89 CHEST WALL PAIN: ICD-10-CM

## 2020-03-18 PROCEDURE — 99999 PR PBB SHADOW E&M-EST. PATIENT-LVL III: ICD-10-PCS | Mod: PBBFAC,,, | Performed by: FAMILY MEDICINE

## 2020-03-18 PROCEDURE — 3077F SYST BP >= 140 MM HG: CPT | Mod: CPTII,S$GLB,, | Performed by: FAMILY MEDICINE

## 2020-03-18 PROCEDURE — 3080F PR MOST RECENT DIASTOLIC BLOOD PRESSURE >= 90 MM HG: ICD-10-PCS | Mod: CPTII,S$GLB,, | Performed by: FAMILY MEDICINE

## 2020-03-18 PROCEDURE — 3077F PR MOST RECENT SYSTOLIC BLOOD PRESSURE >= 140 MM HG: ICD-10-PCS | Mod: CPTII,S$GLB,, | Performed by: FAMILY MEDICINE

## 2020-03-18 PROCEDURE — 99214 PR OFFICE/OUTPT VISIT, EST, LEVL IV, 30-39 MIN: ICD-10-PCS | Mod: S$GLB,,, | Performed by: FAMILY MEDICINE

## 2020-03-18 PROCEDURE — 99214 OFFICE O/P EST MOD 30 MIN: CPT | Mod: S$GLB,,, | Performed by: FAMILY MEDICINE

## 2020-03-18 PROCEDURE — 3008F PR BODY MASS INDEX (BMI) DOCUMENTED: ICD-10-PCS | Mod: CPTII,S$GLB,, | Performed by: FAMILY MEDICINE

## 2020-03-18 PROCEDURE — 3008F BODY MASS INDEX DOCD: CPT | Mod: CPTII,S$GLB,, | Performed by: FAMILY MEDICINE

## 2020-03-18 PROCEDURE — 3080F DIAST BP >= 90 MM HG: CPT | Mod: CPTII,S$GLB,, | Performed by: FAMILY MEDICINE

## 2020-03-18 PROCEDURE — 99999 PR PBB SHADOW E&M-EST. PATIENT-LVL III: CPT | Mod: PBBFAC,,, | Performed by: FAMILY MEDICINE

## 2020-03-18 RX ORDER — PANTOPRAZOLE SODIUM 20 MG/1
TABLET, DELAYED RELEASE ORAL
COMMUNITY
Start: 2020-03-15 | End: 2021-06-14

## 2020-03-18 NOTE — PROGRESS NOTES
Arielle Paige    Chief Complaint   Patient presents with    ER Follow up       History of Present Illness:   Ms. Paige comes in today for ER follow-up.  She states she was evaluated at [facility] ER on [place], March 15, 2020 for chest pain.  She states on Friday night she developed constant chest pain which radiated to her mid back and worsened over the weekend.  She denies having associated abdominal pain, nausea, vomiting, diarrhea, constipation, fever, chills, fatigue, appetite change, shortness of breath, cough, wheezing, palpitations, leg swelling, unusual urinary symptoms, headaches, anxiety, depression, homicidal or suicidal thoughts.  She states during ER visit workup included labs, EKG and chest x-ray which she states results were all okay except she had slight decreased potassium level and was given oral potassium during ER visit.  She was told her chest pain was likely GI-related and prescribed Protonix 20 mg daily for 14 days.  She was told to follow-up with her PCP.    She states she feels 90% better today.  She reports having occasional pain at her mid anterior chest but not at her back.  Otherwise, she denies having other associated symptoms today.    She states she has not had dietary changes.    She states she still has her gallbladder.    She has taken medication today.          Current Outpatient Medications   Medication Sig    amLODIPine (NORVASC) 5 MG tablet Take 1 tablet (5 mg total) by mouth once daily.    hydroCHLOROthiazide (HYDRODIURIL) 12.5 MG Tab Take 1 tablet (12.5 mg total) by mouth once daily.    levothyroxine (SYNTHROID) 125 MCG tablet Take 125 mcg by mouth once daily.    norethindrone-e.estradiol-iron (LO LOESTRIN FE) 1 mg-10 mcg (24)/10 mcg (2) Tab Take 1 tablet by mouth.    pantoprazole (PROTONIX) 20 MG tablet TK 1 T PO D     Review of Systems   Constitutional: Negative for appetite change, chills, fatigue and fever.   Respiratory: Negative for cough,  shortness of breath and wheezing.    Cardiovascular: Positive for chest pain. Negative for palpitations and leg swelling.   Gastrointestinal: Negative for abdominal pain, constipation, diarrhea, nausea and vomiting.   Genitourinary: Negative for difficulty urinating.   Musculoskeletal: Positive for back pain.        See history of present illness.   Neurological: Negative for headaches.   Psychiatric/Behavioral: Negative for dysphoric mood and suicidal ideas. The patient is not nervous/anxious.         Negative for homicidal ideas.       Objective:  Physical Exam   Constitutional: She is oriented to person, place, and time. She appears well-developed and well-nourished. No distress.   Pleasant.   Eyes:   She wears glasses.   Neck: Normal range of motion. Neck supple. No thyromegaly present.   Thyroid absent.   Cardiovascular: Normal rate, regular rhythm, normal heart sounds and intact distal pulses.   No murmur heard.  Pulmonary/Chest: Effort normal and breath sounds normal. No stridor. No respiratory distress. She has no wheezes. She exhibits no tenderness.   Abdominal: Soft. Bowel sounds are normal. She exhibits no distension and no mass. There is no tenderness. There is no guarding.   Musculoskeletal: Normal range of motion. She exhibits tenderness. She exhibits no edema.   She is ambulatory without problems. Slightly tender at upper mid back with full range of motion noted.   Lymphadenopathy:     She has no cervical adenopathy.   Neurological: She is alert and oriented to person, place, and time.   Skin: She is not diaphoretic.   Psychiatric: She has a normal mood and affect. Her behavior is normal. Judgment and thought content normal.   Vitals reviewed.      ASSESSMENT:  1. Hypertension, unspecified type    2. Chest wall pain        PLAN:  Arielle was seen today for er follow up.    Diagnoses and all orders for this visit:    Hypertension, unspecified type    Chest wall pain       Advised patient to perform  home blood pressure checks x 1 - 2 weeks and e-mail me with record. If blood pressure elevation persists, let's plan to increase HCTZ 12.5 mg daily to 25 mg daily while continuing Amlodipine 5 mg daily.  Advised patient to complete 14-day Protonix therapy.  If symptom persists beyond Protonix therapy, let's consider obtaining gallbladder ultrasound.  Okay to take OTC ES Tylenol 1-2 pills twice daily prn pain.  Continue current medications, follow low sodium, low cholesterol, low carb diet, daily walks.  Follow up if symptoms worsen or fail to improve.

## 2020-04-11 DIAGNOSIS — I10 HYPERTENSION, UNSPECIFIED TYPE: ICD-10-CM

## 2020-04-11 RX ORDER — HYDROCHLOROTHIAZIDE 25 MG/1
25 TABLET ORAL DAILY
Qty: 30 TABLET | Refills: 5 | Status: SHIPPED | OUTPATIENT
Start: 2020-04-11 | End: 2020-07-06 | Stop reason: SDUPTHER

## 2020-06-22 ENCOUNTER — PATIENT OUTREACH (OUTPATIENT)
Dept: ADMINISTRATIVE | Facility: HOSPITAL | Age: 45
End: 2020-06-22

## 2020-06-22 NOTE — PROGRESS NOTES
PreVisit Chart Audit Performed   HM updated with recent information     L/M for pt to call to Schedule Mammogram and pap     Gisselle LOPEZ LPN Care Coordinator  Care Coordination Department  [facility]  [phone]

## 2020-07-06 ENCOUNTER — OFFICE VISIT (OUTPATIENT)
Dept: FAMILY MEDICINE | Facility: CLINIC | Age: 45
End: 2020-07-06
Payer: COMMERCIAL

## 2020-07-06 VITALS
BODY MASS INDEX: 41.25 KG/M2 | HEIGHT: 67 IN | DIASTOLIC BLOOD PRESSURE: 86 MMHG | SYSTOLIC BLOOD PRESSURE: 136 MMHG | WEIGHT: 262.81 LBS | HEART RATE: 97 BPM | TEMPERATURE: 98 F | RESPIRATION RATE: 18 BRPM | OXYGEN SATURATION: 95 %

## 2020-07-06 DIAGNOSIS — I10 HYPERTENSION, UNSPECIFIED TYPE: Primary | ICD-10-CM

## 2020-07-06 DIAGNOSIS — E89.0 S/P THYROIDECTOMY: ICD-10-CM

## 2020-07-06 PROCEDURE — 3079F PR MOST RECENT DIASTOLIC BLOOD PRESSURE 80-89 MM HG: ICD-10-PCS | Mod: CPTII,S$GLB,, | Performed by: FAMILY MEDICINE

## 2020-07-06 PROCEDURE — 3008F PR BODY MASS INDEX (BMI) DOCUMENTED: ICD-10-PCS | Mod: CPTII,S$GLB,, | Performed by: FAMILY MEDICINE

## 2020-07-06 PROCEDURE — 3008F BODY MASS INDEX DOCD: CPT | Mod: CPTII,S$GLB,, | Performed by: FAMILY MEDICINE

## 2020-07-06 PROCEDURE — 3075F SYST BP GE 130 - 139MM HG: CPT | Mod: CPTII,S$GLB,, | Performed by: FAMILY MEDICINE

## 2020-07-06 PROCEDURE — 99213 PR OFFICE/OUTPT VISIT, EST, LEVL III, 20-29 MIN: ICD-10-PCS | Mod: S$GLB,,, | Performed by: FAMILY MEDICINE

## 2020-07-06 PROCEDURE — 3075F PR MOST RECENT SYSTOLIC BLOOD PRESS GE 130-139MM HG: ICD-10-PCS | Mod: CPTII,S$GLB,, | Performed by: FAMILY MEDICINE

## 2020-07-06 PROCEDURE — 99999 PR PBB SHADOW E&M-EST. PATIENT-LVL III: CPT | Mod: PBBFAC,,, | Performed by: FAMILY MEDICINE

## 2020-07-06 PROCEDURE — 99213 OFFICE O/P EST LOW 20 MIN: CPT | Mod: S$GLB,,, | Performed by: FAMILY MEDICINE

## 2020-07-06 PROCEDURE — 99999 PR PBB SHADOW E&M-EST. PATIENT-LVL III: ICD-10-PCS | Mod: PBBFAC,,, | Performed by: FAMILY MEDICINE

## 2020-07-06 PROCEDURE — 3079F DIAST BP 80-89 MM HG: CPT | Mod: CPTII,S$GLB,, | Performed by: FAMILY MEDICINE

## 2020-07-06 RX ORDER — HYDROCHLOROTHIAZIDE 25 MG/1
25 TABLET ORAL DAILY
Qty: 30 TABLET | Refills: 5 | Status: SHIPPED | OUTPATIENT
Start: 2020-07-06 | End: 2021-01-12 | Stop reason: SDUPTHER

## 2020-07-06 RX ORDER — AMLODIPINE BESYLATE 5 MG/1
5 TABLET ORAL DAILY
Qty: 30 TABLET | Refills: 5 | Status: SHIPPED | OUTPATIENT
Start: 2020-07-06 | End: 2020-12-16

## 2020-07-06 NOTE — PROGRESS NOTES
Arielle Paige    Chief Complaint   Patient presents with    Hypertension    Follow-up       History of Present Illness:   Ms. Paige comes in today for hypertension follow-up.  She has taken medication today.  She states she exercises 5 times per week, 60-90 minutes each time for the past 2 months.  She states she also monitors her diet.  She states home blood pressure levels range 130/80's.      She states she feels good today.  She denies having acute problems.    She denies having She denies having fever, chills, fatigue, appetite changes; shortness of breath, cough, wheezing; chest pain, palpitations, leg swelling abdominal pain, nausea, vomiting, diarrhea, constipation; unusual urinary symptoms; hot or cold intolerance; back pain; headache; anxiety, depression, homicidal or suicidal thoughts.      She states she is scheduled to see endocrinologist Dr. Gabino Bermudez on July 13, 2020 for surveillance of hypothyroidism status post thyroidectomy for multinodular goiter.      Current Outpatient Medications   Medication Sig    amLODIPine (NORVASC) 5 MG tablet Take 1 tablet (5 mg total) by mouth once daily.    hydroCHLOROthiazide (HYDRODIURIL) 25 MG tablet Take 1 tablet (25 mg total) by mouth once daily.    levothyroxine (SYNTHROID) 125 MCG tablet Take 125 mcg by mouth once daily.    norethindrone-e.estradiol-iron (LO LOESTRIN FE) 1 mg-10 mcg (24)/10 mcg (2) Tab Take 1 tablet by mouth.    pantoprazole (PROTONIX) 20 MG tablet TK 1 T PO D       Review of Systems   Constitutional: Positive for activity change. Negative for appetite change, chills, fatigue and fever.        Weight  121.7 kg (268 lb 4.8 oz) at March 18, 2020 visit.   Respiratory: Negative for cough, shortness of breath and wheezing.    Cardiovascular: Negative for chest pain, palpitations and leg swelling.        See history of present illness.   Gastrointestinal: Negative for abdominal pain, constipation, diarrhea, nausea and vomiting.    Endocrine: Negative for cold intolerance and heat intolerance.        See history of present illness.   Genitourinary: Negative for difficulty urinating.   Musculoskeletal: Negative for back pain.   Neurological: Negative for headaches.   Psychiatric/Behavioral: Negative for dysphoric mood and suicidal ideas. The patient is not nervous/anxious.         Negative for homicidal ideas.       Objective:  Physical Exam  Vitals signs reviewed.   Constitutional:       General: She is not in acute distress.     Appearance: Normal appearance. She is well-developed. She is not ill-appearing, toxic-appearing or diaphoretic.      Comments: Pleasant.   Eyes:      Comments: She wears glasses.   Neck:      Musculoskeletal: Normal range of motion and neck supple.      Thyroid: No thyromegaly.      Comments: Thyroid absent.  Cardiovascular:      Rate and Rhythm: Normal rate and regular rhythm.      Heart sounds: Normal heart sounds. No murmur.   Pulmonary:      Effort: Pulmonary effort is normal. No respiratory distress.      Breath sounds: Normal breath sounds. No stridor. No wheezing.   Chest:      Chest wall: No tenderness.   Abdominal:      General: Bowel sounds are normal. There is no distension.      Palpations: Abdomen is soft. There is no mass.      Tenderness: There is no abdominal tenderness. There is no guarding.   Musculoskeletal: Normal range of motion.         General: No swelling or tenderness.      Comments: She is ambulatory without problems.    Lymphadenopathy:      Cervical: No cervical adenopathy.   Neurological:      General: No focal deficit present.      Mental Status: She is alert and oriented to person, place, and time.   Psychiatric:         Mood and Affect: Mood normal.         Behavior: Behavior normal.         Thought Content: Thought content normal.         Judgment: Judgment normal.         ASSESSMENT:  1. Hypertension, unspecified type    2. S/P thyroidectomy        PLAN:  Arielle was seen today  for hypertension and follow-up.    Diagnoses and all orders for this visit:    Hypertension, unspecified type  -     hydroCHLOROthiazide (HYDRODIURIL) 25 MG tablet; Take 1 tablet (25 mg total) by mouth once daily.  -     amLODIPine (NORVASC) 5 MG tablet; Take 1 tablet (5 mg total) by mouth once daily.    S/P thyroidectomy    Continue current medications, follow low sodium, low cholesterol, low carb diet, daily walks.  Prescription refills as noted above.  Keep follow up with specialists.  Flu shot this fall.  Follow up in about 6 months (around 1/8/2021) for hypertension follow up.

## 2021-01-12 ENCOUNTER — OFFICE VISIT (OUTPATIENT)
Dept: FAMILY MEDICINE | Facility: CLINIC | Age: 46
End: 2021-01-12
Payer: COMMERCIAL

## 2021-01-12 VITALS — SYSTOLIC BLOOD PRESSURE: 129 MMHG | DIASTOLIC BLOOD PRESSURE: 86 MMHG

## 2021-01-12 DIAGNOSIS — I10 HYPERTENSION, UNSPECIFIED TYPE: ICD-10-CM

## 2021-01-12 PROCEDURE — 99213 PR OFFICE/OUTPT VISIT, EST, LEVL III, 20-29 MIN: ICD-10-PCS | Mod: 95,,, | Performed by: FAMILY MEDICINE

## 2021-01-12 PROCEDURE — 3079F DIAST BP 80-89 MM HG: CPT | Mod: CPTII,,, | Performed by: FAMILY MEDICINE

## 2021-01-12 PROCEDURE — 3074F SYST BP LT 130 MM HG: CPT | Mod: CPTII,,, | Performed by: FAMILY MEDICINE

## 2021-01-12 PROCEDURE — 3079F PR MOST RECENT DIASTOLIC BLOOD PRESSURE 80-89 MM HG: ICD-10-PCS | Mod: CPTII,,, | Performed by: FAMILY MEDICINE

## 2021-01-12 PROCEDURE — 3074F PR MOST RECENT SYSTOLIC BLOOD PRESSURE < 130 MM HG: ICD-10-PCS | Mod: CPTII,,, | Performed by: FAMILY MEDICINE

## 2021-01-12 PROCEDURE — 99213 OFFICE O/P EST LOW 20 MIN: CPT | Mod: 95,,, | Performed by: FAMILY MEDICINE

## 2021-01-12 RX ORDER — AMLODIPINE BESYLATE 5 MG/1
5 TABLET ORAL DAILY
Qty: 90 TABLET | Refills: 1 | Status: SHIPPED | OUTPATIENT
Start: 2021-01-12 | End: 2021-06-14 | Stop reason: SDUPTHER

## 2021-01-12 RX ORDER — HYDROCHLOROTHIAZIDE 25 MG/1
25 TABLET ORAL DAILY
Qty: 90 TABLET | Refills: 1 | Status: SHIPPED | OUTPATIENT
Start: 2021-01-12 | End: 2021-06-14 | Stop reason: SDUPTHER

## 2021-01-12 RX ORDER — LEVOTHYROXINE SODIUM 137 UG/1
137 TABLET ORAL
COMMUNITY

## 2021-04-29 ENCOUNTER — PATIENT MESSAGE (OUTPATIENT)
Dept: RESEARCH | Facility: HOSPITAL | Age: 46
End: 2021-04-29

## 2021-06-14 ENCOUNTER — OFFICE VISIT (OUTPATIENT)
Dept: FAMILY MEDICINE | Facility: CLINIC | Age: 46
End: 2021-06-14
Payer: COMMERCIAL

## 2021-06-14 VITALS
TEMPERATURE: 99 F | DIASTOLIC BLOOD PRESSURE: 78 MMHG | WEIGHT: 276.44 LBS | HEIGHT: 67 IN | OXYGEN SATURATION: 98 % | HEART RATE: 98 BPM | RESPIRATION RATE: 18 BRPM | BODY MASS INDEX: 43.39 KG/M2 | SYSTOLIC BLOOD PRESSURE: 122 MMHG

## 2021-06-14 DIAGNOSIS — E89.0 S/P THYROIDECTOMY: ICD-10-CM

## 2021-06-14 DIAGNOSIS — E66.01 MORBIDLY OBESE: ICD-10-CM

## 2021-06-14 DIAGNOSIS — I10 HYPERTENSION, UNSPECIFIED TYPE: ICD-10-CM

## 2021-06-14 DIAGNOSIS — Z00.00 ANNUAL PHYSICAL EXAM: Primary | ICD-10-CM

## 2021-06-14 PROCEDURE — 1126F AMNT PAIN NOTED NONE PRSNT: CPT | Mod: S$GLB,,, | Performed by: FAMILY MEDICINE

## 2021-06-14 PROCEDURE — 99999 PR PBB SHADOW E&M-EST. PATIENT-LVL IV: CPT | Mod: PBBFAC,,, | Performed by: FAMILY MEDICINE

## 2021-06-14 PROCEDURE — 99999 PR PBB SHADOW E&M-EST. PATIENT-LVL IV: ICD-10-PCS | Mod: PBBFAC,,, | Performed by: FAMILY MEDICINE

## 2021-06-14 PROCEDURE — 3008F PR BODY MASS INDEX (BMI) DOCUMENTED: ICD-10-PCS | Mod: CPTII,S$GLB,, | Performed by: FAMILY MEDICINE

## 2021-06-14 PROCEDURE — 99396 PR PREVENTIVE VISIT,EST,40-64: ICD-10-PCS | Mod: S$GLB,,, | Performed by: FAMILY MEDICINE

## 2021-06-14 PROCEDURE — 99396 PREV VISIT EST AGE 40-64: CPT | Mod: S$GLB,,, | Performed by: FAMILY MEDICINE

## 2021-06-14 PROCEDURE — 3008F BODY MASS INDEX DOCD: CPT | Mod: CPTII,S$GLB,, | Performed by: FAMILY MEDICINE

## 2021-06-14 PROCEDURE — 1126F PR PAIN SEVERITY QUANTIFIED, NO PAIN PRESENT: ICD-10-PCS | Mod: S$GLB,,, | Performed by: FAMILY MEDICINE

## 2021-06-14 RX ORDER — HYDROCHLOROTHIAZIDE 25 MG/1
25 TABLET ORAL DAILY
Qty: 90 TABLET | Refills: 1 | Status: SHIPPED | OUTPATIENT
Start: 2021-06-14 | End: 2021-12-14 | Stop reason: SDUPTHER

## 2021-06-14 RX ORDER — AMLODIPINE BESYLATE 5 MG/1
5 TABLET ORAL DAILY
Qty: 90 TABLET | Refills: 1 | Status: SHIPPED | OUTPATIENT
Start: 2021-06-14 | End: 2021-12-14 | Stop reason: SDUPTHER

## 2021-06-17 ENCOUNTER — TELEPHONE (OUTPATIENT)
Dept: FAMILY MEDICINE | Facility: CLINIC | Age: 46
End: 2021-06-17

## 2021-06-17 DIAGNOSIS — Z00.00 ANNUAL PHYSICAL EXAM: Primary | ICD-10-CM

## 2021-06-21 ENCOUNTER — LAB VISIT (OUTPATIENT)
Dept: LAB | Facility: HOSPITAL | Age: 46
End: 2021-06-21
Attending: FAMILY MEDICINE
Payer: COMMERCIAL

## 2021-06-21 ENCOUNTER — LAB VISIT (OUTPATIENT)
Dept: LAB | Facility: HOSPITAL | Age: 46
End: 2021-06-21
Payer: COMMERCIAL

## 2021-06-21 DIAGNOSIS — Z00.00 ANNUAL PHYSICAL EXAM: ICD-10-CM

## 2021-06-21 LAB
ALBUMIN SERPL BCP-MCNC: 3.6 G/DL (ref 3.5–5.2)
ALP SERPL-CCNC: 74 U/L (ref 55–135)
ALT SERPL W/O P-5'-P-CCNC: 17 U/L (ref 10–44)
ANION GAP SERPL CALC-SCNC: 12 MMOL/L (ref 8–16)
AST SERPL-CCNC: 16 U/L (ref 10–40)
BACTERIA #/AREA URNS HPF: ABNORMAL /HPF
BASOPHILS # BLD AUTO: 0.04 K/UL (ref 0–0.2)
BASOPHILS NFR BLD: 0.4 % (ref 0–1.9)
BILIRUB SERPL-MCNC: 0.4 MG/DL (ref 0.1–1)
BILIRUB UR QL STRIP: NEGATIVE
BUN SERPL-MCNC: 10 MG/DL (ref 6–20)
CALCIUM SERPL-MCNC: 9.7 MG/DL (ref 8.7–10.5)
CHLORIDE SERPL-SCNC: 101 MMOL/L (ref 95–110)
CHOLEST SERPL-MCNC: 166 MG/DL (ref 120–199)
CHOLEST/HDLC SERPL: 3.5 {RATIO} (ref 2–5)
CLARITY UR: ABNORMAL
CO2 SERPL-SCNC: 28 MMOL/L (ref 23–29)
COLOR UR: YELLOW
CREAT SERPL-MCNC: 0.8 MG/DL (ref 0.5–1.4)
DIFFERENTIAL METHOD: ABNORMAL
EOSINOPHIL # BLD AUTO: 0.2 K/UL (ref 0–0.5)
EOSINOPHIL NFR BLD: 1.7 % (ref 0–8)
ERYTHROCYTE [DISTWIDTH] IN BLOOD BY AUTOMATED COUNT: 13.8 % (ref 11.5–14.5)
EST. GFR  (AFRICAN AMERICAN): >60 ML/MIN/1.73 M^2
EST. GFR  (NON AFRICAN AMERICAN): >60 ML/MIN/1.73 M^2
GLUCOSE SERPL-MCNC: 103 MG/DL (ref 70–110)
GLUCOSE UR QL STRIP: NEGATIVE
HCT VFR BLD AUTO: 39.6 % (ref 37–48.5)
HCV AB SERPL QL IA: NEGATIVE
HDLC SERPL-MCNC: 48 MG/DL (ref 40–75)
HDLC SERPL: 28.9 % (ref 20–50)
HGB BLD-MCNC: 13.1 G/DL (ref 12–16)
HGB UR QL STRIP: NEGATIVE
HIV 1+2 AB+HIV1 P24 AG SERPL QL IA: NEGATIVE
IMM GRANULOCYTES # BLD AUTO: 0.07 K/UL (ref 0–0.04)
IMM GRANULOCYTES NFR BLD AUTO: 0.6 % (ref 0–0.5)
KETONES UR QL STRIP: NEGATIVE
LDLC SERPL CALC-MCNC: 99.8 MG/DL (ref 63–159)
LEUKOCYTE ESTERASE UR QL STRIP: ABNORMAL
LYMPHOCYTES # BLD AUTO: 4.4 K/UL (ref 1–4.8)
LYMPHOCYTES NFR BLD: 38.8 % (ref 18–48)
MCH RBC QN AUTO: 24.8 PG (ref 27–31)
MCHC RBC AUTO-ENTMCNC: 33.1 G/DL (ref 32–36)
MCV RBC AUTO: 75 FL (ref 82–98)
MICROSCOPIC COMMENT: ABNORMAL
MONOCYTES # BLD AUTO: 0.6 K/UL (ref 0.3–1)
MONOCYTES NFR BLD: 5.1 % (ref 4–15)
NEUTROPHILS # BLD AUTO: 6 K/UL (ref 1.8–7.7)
NEUTROPHILS NFR BLD: 53.4 % (ref 38–73)
NITRITE UR QL STRIP: NEGATIVE
NONHDLC SERPL-MCNC: 118 MG/DL
NRBC BLD-RTO: 0 /100 WBC
PH UR STRIP: 6 [PH] (ref 5–8)
PLATELET # BLD AUTO: 429 K/UL (ref 150–450)
PLATELET BLD QL SMEAR: ABNORMAL
PMV BLD AUTO: 9.7 FL (ref 9.2–12.9)
POTASSIUM SERPL-SCNC: 3.4 MMOL/L (ref 3.5–5.1)
PROT SERPL-MCNC: 7.5 G/DL (ref 6–8.4)
PROT UR QL STRIP: NEGATIVE
RBC # BLD AUTO: 5.29 M/UL (ref 4–5.4)
SODIUM SERPL-SCNC: 141 MMOL/L (ref 136–145)
SP GR UR STRIP: >=1.03 (ref 1–1.03)
SQUAMOUS #/AREA URNS HPF: 10 /HPF
TRIGL SERPL-MCNC: 91 MG/DL (ref 30–150)
URN SPEC COLLECT METH UR: ABNORMAL
WBC # BLD AUTO: 11.29 K/UL (ref 3.9–12.7)
WBC #/AREA URNS HPF: >100 /HPF (ref 0–5)
WBC CLUMPS URNS QL MICRO: ABNORMAL

## 2021-06-21 PROCEDURE — 86803 HEPATITIS C AB TEST: CPT | Performed by: FAMILY MEDICINE

## 2021-06-21 PROCEDURE — 80053 COMPREHEN METABOLIC PANEL: CPT | Performed by: FAMILY MEDICINE

## 2021-06-21 PROCEDURE — 81000 URINALYSIS NONAUTO W/SCOPE: CPT | Performed by: FAMILY MEDICINE

## 2021-06-21 PROCEDURE — 36415 COLL VENOUS BLD VENIPUNCTURE: CPT | Performed by: FAMILY MEDICINE

## 2021-06-21 PROCEDURE — 86703 HIV-1/HIV-2 1 RESULT ANTBDY: CPT | Performed by: FAMILY MEDICINE

## 2021-06-21 PROCEDURE — 80061 LIPID PANEL: CPT | Performed by: FAMILY MEDICINE

## 2021-06-21 PROCEDURE — 85025 COMPLETE CBC W/AUTO DIFF WBC: CPT | Performed by: FAMILY MEDICINE

## 2021-06-22 DIAGNOSIS — N39.0 URINARY TRACT INFECTION WITHOUT HEMATURIA, SITE UNSPECIFIED: Primary | ICD-10-CM

## 2021-06-22 RX ORDER — CIPROFLOXACIN 500 MG/1
500 TABLET ORAL EVERY 12 HOURS
Qty: 14 TABLET | Refills: 0 | Status: SHIPPED | OUTPATIENT
Start: 2021-06-22 | End: 2021-06-29

## 2021-06-30 ENCOUNTER — TELEPHONE (OUTPATIENT)
Dept: FAMILY MEDICINE | Facility: CLINIC | Age: 46
End: 2021-06-30

## 2021-06-30 DIAGNOSIS — N39.0 URINARY TRACT INFECTION WITHOUT HEMATURIA, SITE UNSPECIFIED: Primary | ICD-10-CM

## 2021-07-07 ENCOUNTER — LAB VISIT (OUTPATIENT)
Dept: LAB | Facility: HOSPITAL | Age: 46
End: 2021-07-07
Payer: COMMERCIAL

## 2021-07-07 DIAGNOSIS — N39.0 URINARY TRACT INFECTION WITHOUT HEMATURIA, SITE UNSPECIFIED: ICD-10-CM

## 2021-07-07 PROCEDURE — 87086 URINE CULTURE/COLONY COUNT: CPT | Performed by: FAMILY MEDICINE

## 2021-07-08 LAB — BACTERIA UR CULT: NO GROWTH

## 2021-10-21 NOTE — PROGRESS NOTES
PPD adminstered as ordered by   Patient ifnromed PPD today with reading on this Wednesday at 8:30am or anytime during this day; if  (-), then PPD placement again next Monday with reading the following Wednesday. Patient voiced understanding   Patient Education     4 Steps for Eating Healthier  Changing the way you eat can improve your health. It can lower your cholesterol and blood pressure, and help you stay at a healthy weight. Your diet doesn’t have to be bland and boring to be healthy. Just watch your calories and follow these steps:    Step 1. Eat fewer unhealthy fats  · Choose more fish and lean meats instead of fatty cuts of meat.  · Skip butter and lard, and use less margarine.  · Pass on foods that have palm, coconut, or hydrogenated oils.  · Eat fewer high-fat dairy foods like cheese, ice cream, and whole milk.  · Get a heart-healthy cookbook and try some low-fat recipes.  Step 2. Go light on salt  · Keep the saltshaker off the table.  · Limit high-salt ingredients, such as soy sauce, bouillon, and garlic salt.  · Instead of adding salt when cooking, season your food with herbs and flavorings. Try lemon, garlic, and onion, or salt-free herb seasonings.  · Limit convenience foods, such as boxed or canned foods and restaurant food.  · Read food labels and choose lower-sodium options.  Step 3. Limit sugar  · Pause before you add sugars to pancakes, cereal, coffee, or tea. This includes white and brown table sugar, syrup, honey, and molasses. Cut your usual amount by half.  · Use non-sugar sweeteners. Stevia, aspartame, and sucralose can satisfy a sweet tooth without adding calories.  · Swap out sugar-filled soda and other drinks. Buy sugar-free or low-calorie beverages. Remember water is always the best choice.  · Read labels and choose foods with less added sugar. Keep in mind that dairy foods and foods with fruit will have some natural sugar.  · Cut the sugar in recipes by 1/3 to 1/2. Boost the flavor with extracts like almond, vanilla, or orange. Or add spices such as cinnamon or nutmeg.  Step 4. Eat more fiber  · Eat fresh fruits and vegetables every day.  · Boost your diet with whole grains. Go for oats, whole-grain rice, and bran.  · Add  beans and lentils to your meals.  · Drink more water to match your fiber increase to help prevent constipation.  Date Last Reviewed: 6/1/2017  © 5940-5184 [employer]. [address] [place] [address], [place] [address]. All rights reserved. This information is not intended as a substitute for professional medical care. Always follow your healthcare professional's instructions.         Impression and plan-    Routine physical-discussed diet and activity.  Check routine labs.  Influenza vaccine and Tdap.

## 2021-12-14 ENCOUNTER — OFFICE VISIT (OUTPATIENT)
Dept: FAMILY MEDICINE | Facility: CLINIC | Age: 46
End: 2021-12-14
Payer: COMMERCIAL

## 2021-12-14 VITALS
WEIGHT: 264.56 LBS | HEART RATE: 91 BPM | SYSTOLIC BLOOD PRESSURE: 130 MMHG | OXYGEN SATURATION: 97 % | TEMPERATURE: 98 F | BODY MASS INDEX: 41.52 KG/M2 | DIASTOLIC BLOOD PRESSURE: 78 MMHG | HEIGHT: 67 IN

## 2021-12-14 DIAGNOSIS — I10 HYPERTENSION, UNSPECIFIED TYPE: ICD-10-CM

## 2021-12-14 PROCEDURE — 99213 PR OFFICE/OUTPT VISIT, EST, LEVL III, 20-29 MIN: ICD-10-PCS | Mod: S$GLB,,, | Performed by: FAMILY MEDICINE

## 2021-12-14 PROCEDURE — 99999 PR PBB SHADOW E&M-EST. PATIENT-LVL III: ICD-10-PCS | Mod: PBBFAC,,, | Performed by: FAMILY MEDICINE

## 2021-12-14 PROCEDURE — 99213 OFFICE O/P EST LOW 20 MIN: CPT | Mod: S$GLB,,, | Performed by: FAMILY MEDICINE

## 2021-12-14 PROCEDURE — 99999 PR PBB SHADOW E&M-EST. PATIENT-LVL III: CPT | Mod: PBBFAC,,, | Performed by: FAMILY MEDICINE

## 2021-12-14 RX ORDER — LEVOTHYROXINE SODIUM 137 UG/1
137 TABLET ORAL
COMMUNITY
Start: 2020-01-11 | End: 2021-12-14 | Stop reason: SDUPTHER

## 2021-12-14 RX ORDER — AMLODIPINE BESYLATE 5 MG/1
5 TABLET ORAL DAILY
Qty: 90 TABLET | Refills: 1 | Status: SHIPPED | OUTPATIENT
Start: 2021-12-14 | End: 2022-06-14 | Stop reason: SDUPTHER

## 2021-12-14 RX ORDER — METFORMIN HYDROCHLORIDE 500 MG/1
500 TABLET ORAL 2 TIMES DAILY
COMMUNITY
Start: 2021-10-04 | End: 2023-12-12

## 2021-12-14 RX ORDER — HYDROCHLOROTHIAZIDE 25 MG/1
25 TABLET ORAL DAILY
Qty: 90 TABLET | Refills: 1 | Status: SHIPPED | OUTPATIENT
Start: 2021-12-14 | End: 2022-06-14 | Stop reason: SDUPTHER

## 2022-06-14 ENCOUNTER — OFFICE VISIT (OUTPATIENT)
Dept: FAMILY MEDICINE | Facility: CLINIC | Age: 47
End: 2022-06-14
Payer: COMMERCIAL

## 2022-06-14 ENCOUNTER — LAB VISIT (OUTPATIENT)
Dept: LAB | Facility: HOSPITAL | Age: 47
End: 2022-06-14
Attending: FAMILY MEDICINE
Payer: COMMERCIAL

## 2022-06-14 VITALS
TEMPERATURE: 97 F | HEART RATE: 93 BPM | BODY MASS INDEX: 40.03 KG/M2 | SYSTOLIC BLOOD PRESSURE: 130 MMHG | OXYGEN SATURATION: 98 % | WEIGHT: 255.06 LBS | HEIGHT: 67 IN | DIASTOLIC BLOOD PRESSURE: 70 MMHG

## 2022-06-14 DIAGNOSIS — Z00.00 ANNUAL PHYSICAL EXAM: Primary | ICD-10-CM

## 2022-06-14 DIAGNOSIS — E66.01 SEVERE OBESITY (BMI 35.0-39.9) WITH COMORBIDITY: ICD-10-CM

## 2022-06-14 DIAGNOSIS — E89.0 S/P THYROIDECTOMY: ICD-10-CM

## 2022-06-14 DIAGNOSIS — I10 HYPERTENSION, UNSPECIFIED TYPE: ICD-10-CM

## 2022-06-14 DIAGNOSIS — H61.22 IMPACTED CERUMEN OF LEFT EAR: ICD-10-CM

## 2022-06-14 DIAGNOSIS — Z12.11 COLON CANCER SCREENING: ICD-10-CM

## 2022-06-14 DIAGNOSIS — Z00.00 ANNUAL PHYSICAL EXAM: ICD-10-CM

## 2022-06-14 LAB
BACTERIA #/AREA URNS AUTO: ABNORMAL /HPF
BASOPHILS # BLD AUTO: 0.02 K/UL (ref 0–0.2)
BASOPHILS NFR BLD: 0.2 % (ref 0–1.9)
BILIRUB UR QL STRIP: NEGATIVE
CLARITY UR REFRACT.AUTO: ABNORMAL
COLOR UR AUTO: YELLOW
DIFFERENTIAL METHOD: ABNORMAL
EOSINOPHIL # BLD AUTO: 0.1 K/UL (ref 0–0.5)
EOSINOPHIL NFR BLD: 1.6 % (ref 0–8)
ERYTHROCYTE [DISTWIDTH] IN BLOOD BY AUTOMATED COUNT: 15 % (ref 11.5–14.5)
GLUCOSE UR QL STRIP: NEGATIVE
HCT VFR BLD AUTO: 39.3 % (ref 37–48.5)
HGB BLD-MCNC: 12.1 G/DL (ref 12–16)
HGB UR QL STRIP: NEGATIVE
IMM GRANULOCYTES # BLD AUTO: 0.04 K/UL (ref 0–0.04)
IMM GRANULOCYTES NFR BLD AUTO: 0.5 % (ref 0–0.5)
KETONES UR QL STRIP: NEGATIVE
LEUKOCYTE ESTERASE UR QL STRIP: NEGATIVE
LYMPHOCYTES # BLD AUTO: 3.3 K/UL (ref 1–4.8)
LYMPHOCYTES NFR BLD: 40.4 % (ref 18–48)
MCH RBC QN AUTO: 23.5 PG (ref 27–31)
MCHC RBC AUTO-ENTMCNC: 30.8 G/DL (ref 32–36)
MCV RBC AUTO: 77 FL (ref 82–98)
MICROSCOPIC COMMENT: ABNORMAL
MONOCYTES # BLD AUTO: 0.4 K/UL (ref 0.3–1)
MONOCYTES NFR BLD: 5.1 % (ref 4–15)
NEUTROPHILS # BLD AUTO: 4.3 K/UL (ref 1.8–7.7)
NEUTROPHILS NFR BLD: 52.2 % (ref 38–73)
NITRITE UR QL STRIP: NEGATIVE
NRBC BLD-RTO: 0 /100 WBC
PH UR STRIP: 5 [PH] (ref 5–8)
PLATELET # BLD AUTO: 468 K/UL (ref 150–450)
PMV BLD AUTO: 9.6 FL (ref 9.2–12.9)
PROT UR QL STRIP: NEGATIVE
RBC # BLD AUTO: 5.14 M/UL (ref 4–5.4)
SP GR UR STRIP: 1.02 (ref 1–1.03)
SQUAMOUS #/AREA URNS AUTO: 1 /HPF
URN SPEC COLLECT METH UR: ABNORMAL
WBC # BLD AUTO: 8.27 K/UL (ref 3.9–12.7)
WBC #/AREA URNS AUTO: 5 /HPF (ref 0–5)

## 2022-06-14 PROCEDURE — 99999 PR PBB SHADOW E&M-EST. PATIENT-LVL IV: ICD-10-PCS | Mod: PBBFAC,,, | Performed by: FAMILY MEDICINE

## 2022-06-14 PROCEDURE — 3075F PR MOST RECENT SYSTOLIC BLOOD PRESS GE 130-139MM HG: ICD-10-PCS | Mod: CPTII,S$GLB,, | Performed by: FAMILY MEDICINE

## 2022-06-14 PROCEDURE — 3075F SYST BP GE 130 - 139MM HG: CPT | Mod: CPTII,S$GLB,, | Performed by: FAMILY MEDICINE

## 2022-06-14 PROCEDURE — 3008F BODY MASS INDEX DOCD: CPT | Mod: CPTII,S$GLB,, | Performed by: FAMILY MEDICINE

## 2022-06-14 PROCEDURE — 3008F PR BODY MASS INDEX (BMI) DOCUMENTED: ICD-10-PCS | Mod: CPTII,S$GLB,, | Performed by: FAMILY MEDICINE

## 2022-06-14 PROCEDURE — 3078F DIAST BP <80 MM HG: CPT | Mod: CPTII,S$GLB,, | Performed by: FAMILY MEDICINE

## 2022-06-14 PROCEDURE — 81001 URINALYSIS AUTO W/SCOPE: CPT | Performed by: FAMILY MEDICINE

## 2022-06-14 PROCEDURE — 3078F PR MOST RECENT DIASTOLIC BLOOD PRESSURE < 80 MM HG: ICD-10-PCS | Mod: CPTII,S$GLB,, | Performed by: FAMILY MEDICINE

## 2022-06-14 PROCEDURE — 36415 COLL VENOUS BLD VENIPUNCTURE: CPT | Mod: PO | Performed by: FAMILY MEDICINE

## 2022-06-14 PROCEDURE — 99999 PR PBB SHADOW E&M-EST. PATIENT-LVL IV: CPT | Mod: PBBFAC,,, | Performed by: FAMILY MEDICINE

## 2022-06-14 PROCEDURE — 99396 PR PREVENTIVE VISIT,EST,40-64: ICD-10-PCS | Mod: S$GLB,,, | Performed by: FAMILY MEDICINE

## 2022-06-14 PROCEDURE — 99396 PREV VISIT EST AGE 40-64: CPT | Mod: S$GLB,,, | Performed by: FAMILY MEDICINE

## 2022-06-14 PROCEDURE — 85025 COMPLETE CBC W/AUTO DIFF WBC: CPT | Performed by: FAMILY MEDICINE

## 2022-06-14 RX ORDER — HYDROCHLOROTHIAZIDE 25 MG/1
25 TABLET ORAL DAILY
Qty: 90 TABLET | Refills: 1 | Status: SHIPPED | OUTPATIENT
Start: 2022-06-14 | End: 2023-01-17

## 2022-06-14 RX ORDER — AMLODIPINE BESYLATE 5 MG/1
5 TABLET ORAL DAILY
Qty: 90 TABLET | Refills: 1 | Status: SHIPPED | OUTPATIENT
Start: 2022-06-14 | End: 2023-02-19

## 2022-06-14 NOTE — PROGRESS NOTES
HISTORY OF PRESENT ILLNESS: Ms. Paige comes in today for annual wellness examination.  She is not fasting but has taken medications today.     END OF LIFE DECISION: She does not have a living will. She does desire life support.    Current Outpatient Medications   Medication Sig    amLODIPine (NORVASC) 5 MG tablet Take 1 tablet (5 mg total) by mouth once daily.    hydroCHLOROthiazide (HYDRODIURIL) 25 MG tablet Take 1 tablet (25 mg total) by mouth once daily.    levothyroxine (SYNTHROID) 137 MCG Tab tablet Take 137 mcg by mouth before breakfast.    metFORMIN (GLUCOPHAGE) 500 MG tablet Take 500 mg by mouth 2 (two) times daily.    norethindrone-e.estradioL-iron (LO LOESTRIN FE) 1 mg-10 mcg (24)/10 mcg (2) Tab Take 1 tablet by mouth.     SCREENINGS:    Cholesterol: January 4, 2022.   Mammogram: Never. She she states she will see GYN this year.  Colonoscopy: Never. She desires Fit-Kit.  Dexa Scan: Never.   GYN Exam: 2-1/2 years ago with GYN Dr. Lisa wilhelm. She she states she will see her this year.  Eye Exam: May 2022 per patient. She wears glasses.  PPD: Never.    Immunization History   Administered Date(s) Administered    COVID-19, MRNA, LN-S, PF (Pfizer) (Purple Cap) 03/03/2021, 03/24/2021, 12/08/2021    Influenza 10/07/2021    Influenza - Quadrivalent - PF *Preferred* (6 months and older) 09/18/2019, 10/07/2021    PPD Test 09/18/2019, 09/30/2019, 10/07/2019    Tdap 09/18/2019                           ROS:  GENERAL: Denies fever, chills, fatigue or unusual weight change. Appetite normal. Exercises 6 times per week, 60 to 90 minutes each time. Monitors diet does. Weight 120 kg (264 lb 8.8 oz) at December 14, 2021 visit.  SKIN: Denies rashes, itching, changes in mole, color or texture of skin or easy bruising.  HEAD:  Denies headaches or recent head trauma.  EYES: Denies change in vision, pain, diplopia, redness or discharge.  EARS: Denies ear pain, discharge, vertigo or decreased hearing. Except  "reports left ear pain 2 weeks ago and used hydrogen peroxide drops with help but reports still with slight ache with chewing.  NOSE: Denies loss of smell, epistaxis or rhinitis.  MOUTH & THROAT: Denies hoarseness or change in voice. Denies excessive gum bleeding or mouth sores.  Denies sore throat.  NODES: Denies swollen glands.  CHEST: Denies BECKWITH, wheezing, cough, or sputum production.  CARDIOVASCULAR: Denies chest pain, PND, orthopnea or reduced exercise tolerance.  Denies palpitations. Does not perform home blood pressure checks.  ABDOMEN: Denies diarrhea, constipation, nausea, vomiting, abdominal pain, or blood in stool.  URINARY: Denies flank pain, dysuria or hematuria.  GENITOURINARY: Denies flank pain, dysuria, frequency or hematuria. No change in menses. Performs monthly breast exams does.  ENDOCRINE: Denies diabetes, cholesterol problems. Performs home glucose checks N./A. Follows with Dr. Gabino Bermudez, endocrinologist, for surveillance of postsurgical hypothyroidism, prediabetes, morbid obesity with last visit on February 1, 2022  HEME/LYMPH: Denies bleeding problems.  PERIPHERAL VASCULAR:Denies claudication or cyanosis.  MUSCULOSKELETAL: Denies joint stiffness, pain or swelling. Denies edema.   NEUROLOGIC: Denies history of seizures, tremors, paralysis, alteration of gait or coordination.  PSYCHIATRIC: Denies mood swings, depression, anxiety, homicidal or suicidal thoughts. Denies sleep problems.     PE:   VS: Blood Pressure 130/70   Pulse 93   Temperature 96.8 °F (36 °C)   Height 5' 7" (1.702 m)   Weight 115.7 kg (255 lb 1.2 oz)   Last Menstrual Period 06/13/2022 Comment: Monthly  Oxygen Saturation 98%   Body Mass Index 39.95 kg/m²   APPEARANCE:  Well nourished, well developed female, pleasant and obese, alert and oriented in no acute distress.    HEAD: Nontender. Full range of motion.  EYES: PERRL, conjunctiva pink, lids no edema. She wears glasses.  EARS: External canals not patent, no " swelling or redness. Left TM's remain not visualized with irrigation.  However, Right TM shiny and clear without perforation after successful removal of wax via irrigation.  NOSE: Mucosa and turbinates pink, not swollen. No discharge. Nontender sinuses.  THROAT: No pharyngeal erythema or exudate. No stridor.   NECK: Supple, no mass, thyroid not enlarged.  NODES: No cervical lymph node enlargement.  CHEST: Normal respiratory effort. Lungs clear to auscultation.  CARDIOVASCULAR: Normal S1, S2. No rubs, murmurs or gallops. PMI not displaced. No carotid bruit. Pedal pulses palpable bilaterally. No edema.  ABDOMEN: Bowel sounds present. Not distended. Soft. No tenderness, masses or organomegaly.  BREAST EXAM: Not examined.  PELVIC EXAM: Not examined.  RECTAL EXAM: Not examined.  MUSCULOSKELETAL: No joint deformities or stiffness. She is ambulatory without problems.   SKIN: No rashes or suspicious lesions, normal color and turgor. Well-healed surgical scar at anterior neck noted.  NEUROLOGIC:   Cranial Nerves: II-XII grossly intact.   DTR's: Knees, Ankles 2+ and equal bilaterally. Gait & Posture: Normal gait and fine motion.  PSYCHIATRIC:Patient alert, oriented x 3. Mood/Affect normal without acute anxiety or depression noted. Judgment/insight good as she makes appropriate decisions during today's exam.    ASSESSMENT:    ICD-10-CM ICD-9-CM    1. Annual physical exam  Z00.00 V70.0 CBC Auto Differential      Urinalysis   2. Hypertension, unspecified type  I10 401.9 hydroCHLOROthiazide (HYDRODIURIL) 25 MG tablet      amLODIPine (NORVASC) 5 MG tablet   3. S/P thyroidectomy  E89.0 246.8    4. Impacted cerumen of left ear  H61.22 380.4 Ambulatory referral/consult to ENT   5. Severe obesity (BMI 35.0-39.9) with comorbidity  E66.01 278.01    6. Colon cancer screening  Z12.11 V76.51 Fecal Immunochemical Test (iFOBT)     PLAN:  1. Age-appropriate counseling-appropriate low-sodium, low-cholesterol, low carbohydrate diet and exercise  daily, monthly breast self exam, annual wellness examination.   2. Patient advised to call for results.  3. Continue current medications.  4. Prescription refills as noted above.  5. Keep follow up with specialists.  6. Flu shot this fall.  7. Follow up in about 6 months (around 12/14/2022) for hypertension follow up.

## 2022-06-15 ENCOUNTER — OFFICE VISIT (OUTPATIENT)
Dept: OTOLARYNGOLOGY | Facility: CLINIC | Age: 47
End: 2022-06-15
Payer: COMMERCIAL

## 2022-06-15 ENCOUNTER — PATIENT MESSAGE (OUTPATIENT)
Dept: ADMINISTRATIVE | Facility: HOSPITAL | Age: 47
End: 2022-06-15
Payer: COMMERCIAL

## 2022-06-15 VITALS — SYSTOLIC BLOOD PRESSURE: 128 MMHG | DIASTOLIC BLOOD PRESSURE: 89 MMHG | TEMPERATURE: 98 F

## 2022-06-15 DIAGNOSIS — H61.22 IMPACTED CERUMEN OF LEFT EAR: ICD-10-CM

## 2022-06-15 DIAGNOSIS — Z12.31 OTHER SCREENING MAMMOGRAM: ICD-10-CM

## 2022-06-15 PROCEDURE — 99999 PR PBB SHADOW E&M-EST. PATIENT-LVL III: CPT | Mod: PBBFAC,,, | Performed by: OTOLARYNGOLOGY

## 2022-06-15 PROCEDURE — 3074F SYST BP LT 130 MM HG: CPT | Mod: CPTII,S$GLB,, | Performed by: OTOLARYNGOLOGY

## 2022-06-15 PROCEDURE — 69210 REMOVE IMPACTED EAR WAX UNI: CPT | Mod: S$GLB,,, | Performed by: OTOLARYNGOLOGY

## 2022-06-15 PROCEDURE — 3079F PR MOST RECENT DIASTOLIC BLOOD PRESSURE 80-89 MM HG: ICD-10-PCS | Mod: CPTII,S$GLB,, | Performed by: OTOLARYNGOLOGY

## 2022-06-15 PROCEDURE — 69210 PR REMOVAL IMPACTED CERUMEN REQUIRING INSTRUMENTATION, UNILATERAL: ICD-10-PCS | Mod: S$GLB,,, | Performed by: OTOLARYNGOLOGY

## 2022-06-15 PROCEDURE — 99999 PR PBB SHADOW E&M-EST. PATIENT-LVL III: ICD-10-PCS | Mod: PBBFAC,,, | Performed by: OTOLARYNGOLOGY

## 2022-06-15 PROCEDURE — 1159F MED LIST DOCD IN RCRD: CPT | Mod: CPTII,S$GLB,, | Performed by: OTOLARYNGOLOGY

## 2022-06-15 PROCEDURE — 1159F PR MEDICATION LIST DOCUMENTED IN MEDICAL RECORD: ICD-10-PCS | Mod: CPTII,S$GLB,, | Performed by: OTOLARYNGOLOGY

## 2022-06-15 PROCEDURE — 99499 UNLISTED E&M SERVICE: CPT | Mod: S$GLB,,, | Performed by: OTOLARYNGOLOGY

## 2022-06-15 PROCEDURE — 3079F DIAST BP 80-89 MM HG: CPT | Mod: CPTII,S$GLB,, | Performed by: OTOLARYNGOLOGY

## 2022-06-15 PROCEDURE — 3074F PR MOST RECENT SYSTOLIC BLOOD PRESSURE < 130 MM HG: ICD-10-PCS | Mod: CPTII,S$GLB,, | Performed by: OTOLARYNGOLOGY

## 2022-06-15 PROCEDURE — 99499 NO LOS: ICD-10-PCS | Mod: S$GLB,,, | Performed by: OTOLARYNGOLOGY

## 2022-06-15 NOTE — PROGRESS NOTES
REFERRING PROVIDER  Marisa West Md  [address] [place] [address]  Subjective:   Patient: Arielle Paige [id number], :1975   Visit date:6/15/2022 8:18 AM    Chief Complaint:  Cerumen Impaction (Left ear wax impacted. Started giving her problems two weeks ago. Left ear was slightly painful, she can feel pain when she chews on left side)        HPI:     Arielle Paige is a 47 y.o. female whom I am asked to see for evaluation of otalgia or hearing loss in the left ear for the past 1-4 weeks.   Arielle rates the severity as moderate.  No exacerbating or relieving factors.  There is no drainage from the ears.      Her meds, allergies, medical, surgical, social & family histories were reviewed & updated:  -     She has a current medication list which includes the following prescription(s): amlodipine, hydrochlorothiazide, levothyroxine, metformin, and norethindrone-e.estradiol-iron.  -     She  has a past medical history of Contraceptive management and Multinodular goiter.   -     She does not have any pertinent problems on file.   -     She  has a past surgical history that includes  section, low transverse and Thyroidectomy (2019).  -     She  reports that she has never smoked. She has never used smokeless tobacco. She reports current alcohol use. She reports that she does not use drugs.  -     Her family history includes Cancer in her maternal grandfather and maternal grandmother; Coronary artery disease in her father; Diabetes in her mother; Heart attacks under age 50 in her father; Hypertension in her father and mother; Lung cancer in her paternal grandfather; No Known Problems in her brother.  -     She has No Known Allergies.      Review of Systems:  -     Allergic/Immunologic: has No Known Allergies..  -     Constitutional: Current temp: 97.7 °F (36.5 °C) (Temporal)        Objective:     Physical Exam:  Vitals:  /89   Temp 97.7 °F (36.5 °C) (Temporal)   LMP  06/13/2022 Comment: Monthly  Communication:  Able to communicate, no hoarseness.  Head & Face:  Normocephalic, atraumatic, no sinus tenderness.  Eyes:  Extraocular motions intact.  Ears:  Otoscopy of external auditory canals reveals impaction of left ear canals.  With the patient in the supine position, we used the operating microscope to examine both ears with the appropriate sized ear speculum.  A variety of sterile, micro-instruments were utilized to remove the cerumen atraumatically from the impacted ear(s).   After removal, the ears were reexamined-  Right Ear:  No mass/lesion of auricle. The external auditory canals is without erythema or discharge. Pneumatic otoscopy of the tympanic membrane revealed no perforation and good mobility, with no fluid in middle ear. Clinical speech reception thresholds grossly normal.  Left Ear:  No mass/lesion of auricle. The external auditory canals is without erythema or discharge. Pneumatic otoscopy of the tympanic membrane revealed no perforation and good mobility, with no fluid in middle ear. Clinical speech reception thresholds grossly normal  Nose:  No masses/lesions of external nose, nasal mucosa, septum, and turbinates were within normal limits.  Mouth:  No mass/lesion of lips, teeth, gums, hard/soft palate, tongue, tonsils, or oropharynx.  Neck & Lymphatics:  No cervical lymphadenopathy, no neck mass/crepitus/ asymmetry, trachea is midline, no thyroid enlargement/tenderness/mass.  Neuro/Psych: Alert with normal mood and affect.   Respiration/Chest:  Symmetric expansion during respiration, normal respiratory effort.  Skin:  Warm and intact.    Assessment & Plan:       -     Cerumen Impaction - Arielle has cerumen impaction.  We discussed preventative measures and treatment options.  Q-tips must be avoided, instead the ears can be cleaned with OTC ear rinses (or a mixture of alcohol & vinegar in equal parts).   For hard wax, Arielle may place mineral oil/baby oil in  the ear with a cotton ball at night and remove in the shower.  This will assist in softening the wax and allow it to drain out on its own. If the cerumen impacts the ear canal and causes hearing loss or infection she needs to follow-up in the clinic for treatment and cleaning.

## 2022-06-23 DIAGNOSIS — I10 HYPERTENSION: ICD-10-CM

## 2022-06-30 ENCOUNTER — PATIENT OUTREACH (OUTPATIENT)
Dept: ADMINISTRATIVE | Facility: HOSPITAL | Age: 47
End: 2022-06-30
Payer: COMMERCIAL

## 2022-06-30 NOTE — LETTER
AUTHORIZATION FOR RELEASE OF   CONFIDENTIAL INFORMATION        We are seeing Arielle Paige, date of birth 1975, in the clinic at [facility]. Marisa West MD is the patient's PCP. Arielle Paige has an outstanding lab/procedure at the time we reviewed her chart. In order to help keep her health information updated, she has authorized us to request the following medical record(s):        (  )  MAMMOGRAM                                      (  )  COLONOSCOPY      (X)  PAP SMEAR                                          (  )  OUTSIDE LAB RESULTS     (  )  DEXA SCAN                                          (  )  EYE EXAM            (  )  FOOT EXAM                                          (  )  ENTIRE RECORD     (  )  OUTSIDE IMMUNIZATIONS                 (  )  _______________         Please fax records to [facility], Janet W Cook, MD, [phone]    Elsie Smart [facility]  Care Coordination Department  [facility]  [phone]        Patient Name: Arielle Paige  : 1975  Patient Phone #: [phone]

## 2022-06-30 NOTE — PROGRESS NOTES
Working Cervical Cancer Screening Report:     Spoke wit patient about screening. Patient has OBGYN-Dr. Lisa Davis. Will request records. [facility] faxed to Dr. Davis for pap  Remind me set    Health Maintenance Updated  Immunizations Updated  Care Everywhere Updated  [facility] Searched-None found  [facility] Searched-None found  [facility] Searched-None found

## 2022-12-08 ENCOUNTER — PATIENT MESSAGE (OUTPATIENT)
Dept: FAMILY MEDICINE | Facility: CLINIC | Age: 47
End: 2022-12-08
Payer: COMMERCIAL

## 2023-01-25 ENCOUNTER — PATIENT MESSAGE (OUTPATIENT)
Dept: ADMINISTRATIVE | Facility: HOSPITAL | Age: 48
End: 2023-01-25
Payer: COMMERCIAL

## 2023-02-19 DIAGNOSIS — I10 HYPERTENSION, UNSPECIFIED TYPE: ICD-10-CM

## 2023-02-19 RX ORDER — AMLODIPINE BESYLATE 5 MG/1
TABLET ORAL
Qty: 90 TABLET | Refills: 1 | Status: SHIPPED | OUTPATIENT
Start: 2023-02-19 | End: 2023-06-08 | Stop reason: SDUPTHER

## 2023-02-19 NOTE — TELEPHONE ENCOUNTER
No new care gaps identified.  [facility] Embedded Care Gaps. Reference number: [id number]. 2/19/2023   12:39:09 AM CST

## 2023-02-20 NOTE — TELEPHONE ENCOUNTER
Refill Decision Note   Arielle Paige  is requesting a refill authorization.  Brief Assessment and Rationale for Refill:  Approve     Medication Therapy Plan:       Medication Reconciliation Completed: No   Comments:     No Care Gaps recommended.     Note composed:6:31 PM 02/19/2023

## 2023-04-19 ENCOUNTER — PATIENT MESSAGE (OUTPATIENT)
Dept: ADMINISTRATIVE | Facility: HOSPITAL | Age: 48
End: 2023-04-19
Payer: COMMERCIAL

## 2023-05-02 LAB — BCS RECOMMENDATION EXT: NORMAL

## 2023-05-25 LAB — PAP RECOMMENDATION EXT: NORMAL

## 2023-06-08 ENCOUNTER — LAB VISIT (OUTPATIENT)
Dept: LAB | Facility: HOSPITAL | Age: 48
End: 2023-06-08
Attending: FAMILY MEDICINE
Payer: COMMERCIAL

## 2023-06-08 ENCOUNTER — OFFICE VISIT (OUTPATIENT)
Dept: FAMILY MEDICINE | Facility: CLINIC | Age: 48
End: 2023-06-08
Payer: COMMERCIAL

## 2023-06-08 VITALS
TEMPERATURE: 99 F | BODY MASS INDEX: 42.18 KG/M2 | WEIGHT: 268.75 LBS | HEART RATE: 105 BPM | DIASTOLIC BLOOD PRESSURE: 82 MMHG | RESPIRATION RATE: 18 BRPM | OXYGEN SATURATION: 98 % | HEIGHT: 67 IN | SYSTOLIC BLOOD PRESSURE: 130 MMHG

## 2023-06-08 DIAGNOSIS — Z00.00 ANNUAL PHYSICAL EXAM: ICD-10-CM

## 2023-06-08 DIAGNOSIS — I10 HYPERTENSION, UNSPECIFIED TYPE: ICD-10-CM

## 2023-06-08 DIAGNOSIS — Z00.00 ANNUAL PHYSICAL EXAM: Primary | ICD-10-CM

## 2023-06-08 DIAGNOSIS — E89.0 POSTSURGICAL HYPOTHYROIDISM: ICD-10-CM

## 2023-06-08 DIAGNOSIS — Z12.11 COLON CANCER SCREENING: ICD-10-CM

## 2023-06-08 DIAGNOSIS — E66.01 MORBID OBESITY WITH BMI OF 40.0-44.9, ADULT: ICD-10-CM

## 2023-06-08 DIAGNOSIS — R73.03 PREDIABETES: ICD-10-CM

## 2023-06-08 LAB
BASOPHILS # BLD AUTO: 0.03 K/UL (ref 0–0.2)
BASOPHILS NFR BLD: 0.3 % (ref 0–1.9)
DIFFERENTIAL METHOD: ABNORMAL
EOSINOPHIL # BLD AUTO: 0.1 K/UL (ref 0–0.5)
EOSINOPHIL NFR BLD: 1.4 % (ref 0–8)
ERYTHROCYTE [DISTWIDTH] IN BLOOD BY AUTOMATED COUNT: 14.8 % (ref 11.5–14.5)
HCT VFR BLD AUTO: 36.8 % (ref 37–48.5)
HGB BLD-MCNC: 12.1 G/DL (ref 12–16)
IMM GRANULOCYTES # BLD AUTO: 0.04 K/UL (ref 0–0.04)
IMM GRANULOCYTES NFR BLD AUTO: 0.4 % (ref 0–0.5)
LYMPHOCYTES # BLD AUTO: 3.6 K/UL (ref 1–4.8)
LYMPHOCYTES NFR BLD: 38.8 % (ref 18–48)
MCH RBC QN AUTO: 24.3 PG (ref 27–31)
MCHC RBC AUTO-ENTMCNC: 32.9 G/DL (ref 32–36)
MCV RBC AUTO: 74 FL (ref 82–98)
MONOCYTES # BLD AUTO: 0.4 K/UL (ref 0.3–1)
MONOCYTES NFR BLD: 4.4 % (ref 4–15)
NEUTROPHILS # BLD AUTO: 5 K/UL (ref 1.8–7.7)
NEUTROPHILS NFR BLD: 54.7 % (ref 38–73)
NRBC BLD-RTO: 0 /100 WBC
PLATELET # BLD AUTO: 425 K/UL (ref 150–450)
PMV BLD AUTO: 9.9 FL (ref 9.2–12.9)
RBC # BLD AUTO: 4.97 M/UL (ref 4–5.4)
WBC # BLD AUTO: 9.23 K/UL (ref 3.9–12.7)

## 2023-06-08 PROCEDURE — 3008F BODY MASS INDEX DOCD: CPT | Mod: CPTII,S$GLB,, | Performed by: FAMILY MEDICINE

## 2023-06-08 PROCEDURE — 3079F DIAST BP 80-89 MM HG: CPT | Mod: CPTII,S$GLB,, | Performed by: FAMILY MEDICINE

## 2023-06-08 PROCEDURE — 99999 PR PBB SHADOW E&M-EST. PATIENT-LVL V: ICD-10-PCS | Mod: PBBFAC,,, | Performed by: FAMILY MEDICINE

## 2023-06-08 PROCEDURE — 99396 PR PREVENTIVE VISIT,EST,40-64: ICD-10-PCS | Mod: S$GLB,,, | Performed by: FAMILY MEDICINE

## 2023-06-08 PROCEDURE — 1160F PR REVIEW ALL MEDS BY PRESCRIBER/CLIN PHARMACIST DOCUMENTED: ICD-10-PCS | Mod: CPTII,S$GLB,, | Performed by: FAMILY MEDICINE

## 2023-06-08 PROCEDURE — 3075F SYST BP GE 130 - 139MM HG: CPT | Mod: CPTII,S$GLB,, | Performed by: FAMILY MEDICINE

## 2023-06-08 PROCEDURE — 99999 PR PBB SHADOW E&M-EST. PATIENT-LVL V: CPT | Mod: PBBFAC,,, | Performed by: FAMILY MEDICINE

## 2023-06-08 PROCEDURE — 85025 COMPLETE CBC W/AUTO DIFF WBC: CPT | Performed by: FAMILY MEDICINE

## 2023-06-08 PROCEDURE — 3075F PR MOST RECENT SYSTOLIC BLOOD PRESS GE 130-139MM HG: ICD-10-PCS | Mod: CPTII,S$GLB,, | Performed by: FAMILY MEDICINE

## 2023-06-08 PROCEDURE — 3008F PR BODY MASS INDEX (BMI) DOCUMENTED: ICD-10-PCS | Mod: CPTII,S$GLB,, | Performed by: FAMILY MEDICINE

## 2023-06-08 PROCEDURE — 1160F RVW MEDS BY RX/DR IN RCRD: CPT | Mod: CPTII,S$GLB,, | Performed by: FAMILY MEDICINE

## 2023-06-08 PROCEDURE — 1159F MED LIST DOCD IN RCRD: CPT | Mod: CPTII,S$GLB,, | Performed by: FAMILY MEDICINE

## 2023-06-08 PROCEDURE — 3079F PR MOST RECENT DIASTOLIC BLOOD PRESSURE 80-89 MM HG: ICD-10-PCS | Mod: CPTII,S$GLB,, | Performed by: FAMILY MEDICINE

## 2023-06-08 PROCEDURE — 99396 PREV VISIT EST AGE 40-64: CPT | Mod: S$GLB,,, | Performed by: FAMILY MEDICINE

## 2023-06-08 PROCEDURE — 36415 COLL VENOUS BLD VENIPUNCTURE: CPT | Mod: PO | Performed by: FAMILY MEDICINE

## 2023-06-08 PROCEDURE — 1159F PR MEDICATION LIST DOCUMENTED IN MEDICAL RECORD: ICD-10-PCS | Mod: CPTII,S$GLB,, | Performed by: FAMILY MEDICINE

## 2023-06-08 RX ORDER — HYDROCHLOROTHIAZIDE 25 MG/1
25 TABLET ORAL DAILY
Qty: 90 TABLET | Refills: 3 | Status: SHIPPED | OUTPATIENT
Start: 2023-06-08

## 2023-06-08 RX ORDER — AMLODIPINE BESYLATE 5 MG/1
5 TABLET ORAL DAILY
Qty: 90 TABLET | Refills: 3 | Status: SHIPPED | OUTPATIENT
Start: 2023-06-08

## 2023-06-08 NOTE — PROGRESS NOTES
HISTORY OF PRESENT ILLNESS: Ms. Paige comes in today for annual wellness examination.  She is not fasting but has taken medications today.     END OF LIFE DECISION: She does not have a living will. She does desire life support.    Current Outpatient Medications   Medication Sig    amLODIPine (NORVASC) 5 MG tablet TAKE 1 TABLET BY MOUTH EVERY DAY    hydroCHLOROthiazide (HYDRODIURIL) 25 MG tablet TAKE 1 TABLET BY MOUTH EVERY DAY    levothyroxine (SYNTHROID) 137 MCG Tab tablet Take 137 mcg by mouth before breakfast.    metFORMIN (GLUCOPHAGE) 500 MG tablet Take 500 mg by mouth 2 (two) times daily.    norethindrone-e.estradioL-iron (LO LOESTRIN FE) 1 mg-10 mcg (24)/10 mcg (2) Tab Take 1 tablet by mouth.     SCREENINGS:    Cholesterol: February 27, 2023.   Mammogram: May 2023 with GYN Dr. Lisa wilhelm.   Colonoscopy: Never. She desires.  Dexa Scan: Never.   GYN Exam: May 2023 with GYN Dr. Lisa wilhelm.   Eye Exam: May 2022 per patient. She wears glasses.  PPD: Never.    Immunization History   Administered Date(s) Administered    COVID-19, MRNA, LN-S, PF (Pfizer) (Purple Cap) 03/03/2021, 03/24/2021, 12/08/2021    Influenza 10/07/2021    Influenza - Quadrivalent - PF *Preferred* (6 months and older) 09/18/2019, 10/07/2021, 10/28/2022    PPD Test 09/18/2019, 09/30/2019, 10/07/2019    Tdap 09/18/2019                           ROS:  GENERAL: Denies fever, chills, fatigue or unusual weight change. Appetite normal. Exercises 4 times per week, 30 to 45 minutes each time by treadmill, [employer]. Monitors diet does. Weight 115.7 kg (255 lb 1.2 oz) at June 14, 2022 visit.  SKIN: Denies rashes, itching, changes in mole, color or texture of skin or easy bruising.  HEAD:  Denies headaches or recent head trauma.  EYES: Denies change in vision, pain, diplopia, redness or discharge.  EARS: Denies ear pain, discharge, vertigo or decreased hearing.   NOSE: Denies loss of smell, epistaxis or rhinitis.  MOUTH & THROAT:  "Denies hoarseness or change in voice. Denies excessive gum bleeding or mouth sores.  Denies sore throat.  NODES: Denies swollen glands.  CHEST: Denies BECKWITH, wheezing, cough, or sputum production.  CARDIOVASCULAR: Denies chest pain, PND, orthopnea or reduced exercise tolerance.  Denies palpitations. Occasionally performs home blood pressure checks with levels ranging 130/80's.  ABDOMEN: Denies diarrhea, constipation, nausea, vomiting, abdominal pain, or blood in stool.  URINARY: Denies flank pain, dysuria or hematuria.  GENITOURINARY: Denies flank pain, dysuria, frequency or hematuria. No change in menses. Performs monthly breast exams does.  ENDOCRINE: Denies diabetes, cholesterol problems. Performs home glucose checks N./A. Follows with Dr. Gabino Bermudez, endocrinologist, for surveillance of postsurgical hypothyroidism, prediabetes, morbid obesity with last visit on March 1, 2023 with 6-month follow up advised and scheduled for July/August 2023.  HEME/LYMPH: Denies bleeding problems.  PERIPHERAL VASCULAR:Denies claudication or cyanosis.  MUSCULOSKELETAL: Denies joint stiffness, pain or swelling. Denies edema.   NEUROLOGIC: Denies history of seizures, tremors, paralysis, alteration of gait or coordination.  PSYCHIATRIC: Denies mood swings, depression, anxiety, homicidal or suicidal thoughts. Denies sleep problems.     PE:   VS: Blood Pressure 130/82 (BP Location: Left arm, Patient Position: Sitting, BP Method: Large (Manual))   Pulse 105   Temperature 98.5 °F (36.9 °C)   Respiration 18   Height 5' 7" (1.702 m)   Weight 121.9 kg (268 lb 11.9 oz)   Last Menstrual Period 06/01/2023 Comment: Monthly  Oxygen Saturation 98%   Body Mass Index 42.09 kg/m²   APPEARANCE:  Well nourished, well developed female, pleasant and obese, alert and oriented in no acute distress.    HEAD: Nontender. Full range of motion.  EYES: PERRL, conjunctiva pink, lids no edema. She wears glasses.  EARS: External canals patent, no " swelling or redness. TM's shiny and clear.  NOSE: Mucosa and turbinates pink, not swollen. No discharge. Nontender sinuses.  THROAT: No pharyngeal erythema or exudate. No stridor.   NECK: Supple, no mass, thyroid not enlarged.  NODES: No cervical lymph node enlargement.  CHEST: Normal respiratory effort. Lungs clear to auscultation.  CARDIOVASCULAR: Normal S1, S2. No rubs, murmurs or gallops. PMI not displaced. No carotid bruit. Pedal pulses palpable bilaterally. No edema.  ABDOMEN: Bowel sounds present. Not distended. Soft. No tenderness, masses or organomegaly.  BREAST EXAM: Not examined.  PELVIC EXAM: Not examined.  RECTAL EXAM: Not examined.  MUSCULOSKELETAL: No joint deformities or stiffness. She is ambulatory without problems.   SKIN: No rashes or suspicious lesions, normal color and turgor. Well-healed surgical scar at anterior neck noted.  NEUROLOGIC:   Cranial Nerves: II-XII grossly intact.   DTR's: Knees, Ankles 2+ and equal bilaterally. Gait & Posture: Normal gait and fine motion.  PSYCHIATRIC:Patient alert, oriented x 3. Mood/Affect normal without acute anxiety or depression noted. Judgment/insight good as she makes appropriate decisions during today's exam.    ASSESSMENT:    ICD-10-CM ICD-9-CM    1. Annual physical exam  Z00.00 V70.0 CBC Auto Differential      Urinalysis      2. Hypertension, unspecified type  I10 401.9 amLODIPine (NORVASC) 5 MG tablet      hydroCHLOROthiazide (HYDRODIURIL) 25 MG tablet      3. Postsurgical hypothyroidism  E89.0 244.0       4. Prediabetes  R73.03 790.29       5. Morbid obesity with BMI of 40.0-44.9, adult  E66.01 278.01     Z68.41 V85.41       6. Colon cancer screening  Z12.11 V76.51 Ambulatory referral/consult to Endo Procedure         PLAN:  1. Age-appropriate counseling-appropriate low-sodium, low-cholesterol, low carbohydrate diet and exercise daily, monthly breast self exam, annual wellness examination.   2. Patient advised to call for results.  3.  Continue current medications.  4. Prescription refills as noted above.  5. Keep follow up with specialists.  6. Flu shot this fall.  7. Follow up in about 6 months (around 12/8/2023) for hypertension follow up.

## 2023-06-09 ENCOUNTER — HOSPITAL ENCOUNTER (OUTPATIENT)
Dept: PREADMISSION TESTING | Facility: HOSPITAL | Age: 48
Discharge: HOME | End: 2023-06-09
Attending: INTERNAL MEDICINE
Payer: COMMERCIAL

## 2023-06-09 DIAGNOSIS — Z12.11 COLON CANCER SCREENING: Primary | ICD-10-CM

## 2023-06-09 RX ORDER — SODIUM, POTASSIUM,MAG SULFATES 17.5-3.13G
1 SOLUTION, RECONSTITUTED, ORAL ORAL DAILY
Qty: 1 KIT | Refills: 0 | Status: SHIPPED | OUTPATIENT
Start: 2023-06-09 | End: 2023-06-11

## 2023-06-13 ENCOUNTER — PATIENT OUTREACH (OUTPATIENT)
Dept: ADMINISTRATIVE | Facility: HOSPITAL | Age: 48
End: 2023-06-13
Payer: COMMERCIAL

## 2023-06-23 NOTE — PROGRESS NOTES
Manually uploaded and hyper-linked PAP SMEAR 5/25/2023, MAMMOGRAM 5/2/2023 & ADDITIONAL VIEWS MAMMOGRAM 5/11/2023

## 2023-06-30 ENCOUNTER — ANESTHESIA EVENT (OUTPATIENT)
Dept: ENDOSCOPY | Facility: HOSPITAL | Age: 48
End: 2023-06-30
Payer: COMMERCIAL

## 2023-06-30 NOTE — ANESTHESIA PREPROCEDURE EVALUATION
2023  Arielle Paige is a 48 y.o., female.  Past Surgical History:   Procedure Laterality Date     SECTION, LOW TRANSVERSE      x 1    THYROIDECTOMY  2019     Past Medical History:   Diagnosis Date    Contraceptive management     Multinodular goiter          Pre-op Assessment    I have reviewed the Patient Summary Reports.     I have reviewed the Nursing Notes. I have reviewed the NPO Status.   I have reviewed the Medications.     Review of Systems  Anesthesia Hx:  No problems with previous Anesthesia  History of prior surgery of interest to airway management or planning: Previous anesthesia: General   Social:  Non-Smoker, Social Alcohol Use    Cardiovascular:   Hypertension    Hepatic/GI:   Bowel Prep.    Endocrine:   Hypothyroidism  Morbid Obesity / BMI > 40Denies Obesity / BMI > 30      Physical Exam  General: Alert and Oriented    Airway:  Mallampati: II   Mouth Opening: Normal  TM Distance: Normal  Tongue: Normal  Neck ROM: Normal ROM    Dental:  Intact    Chest/Lungs:  Clear to auscultation, Normal Respiratory Rate    Heart:  Rate: Normal  Rhythm: Regular Rhythm        Anesthesia Plan  Type of Anesthesia, risks & benefits discussed:    Anesthesia Type: Gen Natural Airway  Intra-op Monitoring Plan: Standard ASA Monitors  Induction:  IV  Informed Consent: Informed consent signed with the Patient and all parties understand the risks and agree with anesthesia plan.  All questions answered. Patient consented to blood products? No  ASA Score: 2  Day of Surgery Review of History & Physical: H&P Update referred to the surgeon/provider.    Ready For Surgery From Anesthesia Perspective.     .

## 2023-07-03 ENCOUNTER — HOSPITAL ENCOUNTER (OUTPATIENT)
Facility: HOSPITAL | Age: 48
Discharge: HOME | End: 2023-07-03
Attending: INTERNAL MEDICINE | Admitting: INTERNAL MEDICINE
Payer: COMMERCIAL

## 2023-07-03 ENCOUNTER — ANESTHESIA (OUTPATIENT)
Dept: ENDOSCOPY | Facility: HOSPITAL | Age: 48
End: 2023-07-03
Payer: COMMERCIAL

## 2023-07-03 VITALS
HEART RATE: 71 BPM | WEIGHT: 262.38 LBS | BODY MASS INDEX: 41.18 KG/M2 | RESPIRATION RATE: 18 BRPM | HEIGHT: 67 IN | SYSTOLIC BLOOD PRESSURE: 128 MMHG | OXYGEN SATURATION: 99 % | TEMPERATURE: 97 F | DIASTOLIC BLOOD PRESSURE: 77 MMHG

## 2023-07-03 DIAGNOSIS — Z12.12 ENCOUNTER FOR COLORECTAL CANCER SCREENING: Primary | ICD-10-CM

## 2023-07-03 DIAGNOSIS — Z12.11 ENCOUNTER FOR COLORECTAL CANCER SCREENING: Primary | ICD-10-CM

## 2023-07-03 LAB
B-HCG UR QL: NEGATIVE
CTP QC/QA: YES

## 2023-07-03 PROCEDURE — 37000009 HC ANESTHESIA EA ADD 15 MINS: Performed by: INTERNAL MEDICINE

## 2023-07-03 PROCEDURE — 88305 TISSUE EXAM BY PATHOLOGIST: CPT | Performed by: STUDENT IN AN ORGANIZED HEALTH CARE EDUCATION/TRAINING PROGRAM

## 2023-07-03 PROCEDURE — 88305 TISSUE EXAM BY PATHOLOGIST: CPT | Mod: 26,,, | Performed by: STUDENT IN AN ORGANIZED HEALTH CARE EDUCATION/TRAINING PROGRAM

## 2023-07-03 PROCEDURE — 45380 COLONOSCOPY AND BIOPSY: CPT | Mod: PT | Performed by: INTERNAL MEDICINE

## 2023-07-03 PROCEDURE — D9220A PRA ANESTHESIA: Mod: 33,,, | Performed by: NURSE ANESTHETIST, CERTIFIED REGISTERED

## 2023-07-03 PROCEDURE — 63600175 PHARM REV CODE 636 W HCPCS: Performed by: INTERNAL MEDICINE

## 2023-07-03 PROCEDURE — 25000003 PHARM REV CODE 250: Performed by: NURSE ANESTHETIST, CERTIFIED REGISTERED

## 2023-07-03 PROCEDURE — 25000003 PHARM REV CODE 250: Performed by: INTERNAL MEDICINE

## 2023-07-03 PROCEDURE — 27201012 HC FORCEPS, HOT/COLD, DISP: Performed by: INTERNAL MEDICINE

## 2023-07-03 PROCEDURE — D9220A PRA ANESTHESIA: ICD-10-PCS | Mod: 33,,, | Performed by: NURSE ANESTHETIST, CERTIFIED REGISTERED

## 2023-07-03 PROCEDURE — 88305 TISSUE EXAM BY PATHOLOGIST: ICD-10-PCS | Mod: 26,,, | Performed by: STUDENT IN AN ORGANIZED HEALTH CARE EDUCATION/TRAINING PROGRAM

## 2023-07-03 PROCEDURE — 45380 COLONOSCOPY AND BIOPSY: CPT | Mod: 33,,, | Performed by: INTERNAL MEDICINE

## 2023-07-03 PROCEDURE — 63600175 PHARM REV CODE 636 W HCPCS: Performed by: NURSE ANESTHETIST, CERTIFIED REGISTERED

## 2023-07-03 PROCEDURE — 37000008 HC ANESTHESIA 1ST 15 MINUTES: Performed by: INTERNAL MEDICINE

## 2023-07-03 PROCEDURE — 81025 URINE PREGNANCY TEST: CPT | Performed by: INTERNAL MEDICINE

## 2023-07-03 PROCEDURE — 45380 PR COLONOSCOPY,BIOPSY: ICD-10-PCS | Mod: 33,,, | Performed by: INTERNAL MEDICINE

## 2023-07-03 RX ORDER — PROPOFOL 10 MG/ML
VIAL (ML) INTRAVENOUS
Status: DISCONTINUED | OUTPATIENT
Start: 2023-07-03 | End: 2023-07-03

## 2023-07-03 RX ORDER — SODIUM CHLORIDE, SODIUM LACTATE, POTASSIUM CHLORIDE, CALCIUM CHLORIDE 600; 310; 30; 20 MG/100ML; MG/100ML; MG/100ML; MG/100ML
INJECTION, SOLUTION INTRAVENOUS CONTINUOUS
Status: DISCONTINUED | OUTPATIENT
Start: 2023-07-03 | End: 2023-07-03 | Stop reason: HOSPADM

## 2023-07-03 RX ORDER — LIDOCAINE HYDROCHLORIDE 20 MG/ML
INJECTION, SOLUTION EPIDURAL; INFILTRATION; INTRACAUDAL; PERINEURAL
Status: DISCONTINUED | OUTPATIENT
Start: 2023-07-03 | End: 2023-07-03

## 2023-07-03 RX ORDER — DEXTROMETHORPHAN/PSEUDOEPHED 2.5-7.5/.8
DROPS ORAL
Status: COMPLETED | OUTPATIENT
Start: 2023-07-03 | End: 2023-07-03

## 2023-07-03 RX ADMIN — PROPOFOL 20 MG: 10 INJECTION, EMULSION INTRAVENOUS at 11:07

## 2023-07-03 RX ADMIN — PROPOFOL 40 MG: 10 INJECTION, EMULSION INTRAVENOUS at 11:07

## 2023-07-03 RX ADMIN — LIDOCAINE HYDROCHLORIDE 20 MG: 20 INJECTION, SOLUTION EPIDURAL; INFILTRATION; INTRACAUDAL; PERINEURAL at 11:07

## 2023-07-03 RX ADMIN — PROPOFOL 100 MG: 10 INJECTION, EMULSION INTRAVENOUS at 11:07

## 2023-07-03 RX ADMIN — PROPOFOL 20 MG: 10 INJECTION, EMULSION INTRAVENOUS at 12:07

## 2023-07-03 RX ADMIN — SODIUM CHLORIDE, POTASSIUM CHLORIDE, SODIUM LACTATE AND CALCIUM CHLORIDE: 600; 310; 30; 20 INJECTION, SOLUTION INTRAVENOUS at 10:07

## 2023-07-03 NOTE — TRANSFER OF CARE
"Anesthesia Transfer of Care Note    Patient: Arielle Paige    Procedure(s) Performed: Procedure(s) (LRB):  COLONOSCOPY (N/A)    Patient location: PACU    Anesthesia Type: general    Transport from OR: Transported from OR on room air with adequate spontaneous ventilation    Post pain: adequate analgesia    Post assessment: no apparent anesthetic complications and tolerated procedure well    Post vital signs: stable    Level of consciousness: awake and alert    Nausea/Vomiting: no nausea/vomiting    Complications: none    Transfer of care protocol was followed      Last vitals:   Visit Vitals  BP (!) 142/72 (BP Location: Right arm, Patient Position: Sitting)   Pulse 91   Temp 35.8 °C (96.5 °F) (Temporal)   Resp 16   Ht 5' 7" (1.702 m)   Wt 119 kg (262 lb 5.6 oz)   LMP 06/01/2023   SpO2 96%   Breastfeeding No   BMI 41.09 kg/m²     "

## 2023-07-03 NOTE — ANESTHESIA POSTPROCEDURE EVALUATION
Anesthesia Post Evaluation    Patient: Arielle Paige    Procedure(s) Performed: Procedure(s) (LRB):  COLONOSCOPY (N/A)    Final Anesthesia Type: general      Patient location during evaluation: PACU  Patient participation: Yes- Able to Participate  Level of consciousness: awake and alert and oriented  Post-procedure vital signs: reviewed and stable  Pain management: adequate  Airway patency: patent    PONV status at discharge: No PONV  Anesthetic complications: no      Cardiovascular status: blood pressure returned to baseline, stable and hemodynamically stable  Respiratory status: unassisted  Hydration status: euvolemic  Follow-up not needed.          Vitals Value Taken Time   /77 07/03/23 1230   Temp 36.1 °C (97 °F) 07/03/23 1210   Pulse 73 07/03/23 1233   Resp 25 07/03/23 1233   SpO2 99 % 07/03/23 1233   Vitals shown include unvalidated device data.      Event Time   Out of Recovery 12:38:00         Pain/Sera Score: Sera Score: 10 (7/3/2023 12:30 PM)

## 2023-07-03 NOTE — DISCHARGE SUMMARY
[facility] - Endoscopy 1st Fl  Discharge Note  Short Stay    Procedure(s) (LRB):  COLONOSCOPY (N/A)      OUTCOME: Patient tolerated treatment/procedure well without complication and is now ready for discharge.    DISPOSITION: Home or Self Care    FINAL DIAGNOSIS:  Encounter for colorectal cancer screening    FOLLOWUP: With primary care provider    DISCHARGE INSTRUCTIONS:  No discharge procedures on file.      Clinical Reference Documents Added to Patient Instructions         Document    COLON POLYPECTOMY DISCHARGE INSTRUCTIONS (ENGLISH)

## 2023-07-03 NOTE — [employer] PATIENT INSTRUCTIONS
Discharge Summary/Instructions after an Endoscopic Procedure  Patient Name: Arielle Paige  Patient MRN: [id number]  Patient YOB: 1975  Monday, July 3, 2023  Ana Laura Overton MD  Dear patient,  As a result of recent federal legislation (The Federal Cures Act), you may   receive lab or pathology results from your procedure in your [facility]   account before your physician is able to contact you. Your physician or   their representative will relay the results to you with their   recommendations at their soonest availability.  Thank you,  RESTRICTIONS:  During your procedure today, you received medications for sedation.  These   medications may affect your judgment, balance and coordination.  Therefore,   for 24 hours, you have the following restrictions:   - DO NOT drive a car, operate machinery, make legal/financial decisions,   sign important papers or drink alcohol.    ACTIVITY:  Today: no heavy lifting, straining or running due to procedural   sedation/anesthesia.  The following day: return to full activity including work.  DIET:  Eat and drink normally unless instructed otherwise.     TREATMENT FOR COMMON SIDE EFFECTS:  - Mild abdominal pain, nausea, belching, bloating or excessive gas:  rest,   eat lightly and use a heating pad.  - Sore Throat: treat with throat lozenges and/or gargle with warm salt   water.  - Because air was used during the procedure, expelling large amounts of air   from your rectum or belching is normal.  - If a bowel prep was taken, you may not have a bowel movement for 1-3 days.    This is normal.  SYMPTOMS TO WATCH FOR AND REPORT TO YOUR PHYSICIAN:  1. Abdominal pain or bloating, other than gas cramps.  2. Chest pain.  3. Back pain.  4. Signs of infection such as: chills or fever occurring within 24 hours   after the procedure.  5. Rectal bleeding, which would show as bright red, maroon, or black stools.   (A tablespoon of blood from the rectum is not serious, especially  if   hemorrhoids are present.)  6. Vomiting.  7. Weakness or dizziness.  GO DIRECTLY TO THE NEAREST EMERGENCY ROOM IF YOU HAVE ANY OF THE FOLLOWING:      Difficulty breathing              Chills and/or fever over 101 F   Persistent vomiting and/or vomiting blood   Severe abdominal pain   Severe chest pain   Black, tarry stools   Bleeding- more than one tablespoon   Any other symptom or condition that you feel may need urgent attention  Your doctor recommends these additional instructions:  If any biopsies were taken, your doctors clinic will contact you in 1 to 2   weeks with any results.  - Discharge patient to home (via wheelchair).   - Resume previous diet.   - Continue present medications.   - Await pathology results.   - Repeat colonoscopy in 5 years for surveillance.   - Patient has a contact number available for emergencies.  The signs and   symptoms of potential delayed complications were discussed with the   patient.  Return to normal activities tomorrow.  Written discharge   instructions were provided to the patient.  For questions, problems or results please call your physician Ana Laura Overton MD at Work:  [phone]  If you have any questions about the above instructions, call the GI   department at [phone] or call the endoscopy unit at [phone]   from 7am until 3 pm.  [facility] [place], EMERGENCY ROOM PHONE NUMBER:   [phone]  IF A COMPLICATION OR EMERGENCY SITUATION ARISES AND YOU ARE UNABLE TO REACH   YOUR PHYSICIAN - GO DIRECTLY TO THE EMERGENCY ROOM.  I have read or have had read to me these discharge instructions for my   procedure and have received a written copy.  I understand these   instructions and will follow-up with my physician if I have any questions.     __________________________________       _____________________________________  Nurse Signature                                          Patient/Designated   Responsible Party  Signature  MD Ana Laura Hanley MD  7/3/2023 12:11:20 PM  [employer]

## 2023-07-03 NOTE — H&P
Short Stay Endoscopy History and Physical    PCP - Marisa West MD    Procedure - Colonoscopy  ASA - 2  Mallampati - per anesthesia  History of Anesthesia problems - no  Family history Anesthesia problems -  no     HPI:  This is a 48 y.o. female here for evaluation of :   Active Hospital Problems    Diagnosis  POA    *Encounter for colorectal cancer screening [Z12.11, Z12.12]  Not Applicable      Resolved Hospital Problems   No resolved problems to display.         Health Maintenance         Date Due Completion Date    Colorectal Cancer Screening Never done ---    COVID-19 Vaccine (4 - Pfizer series) 2022    Influenza Vaccine (1) 2023 10/28/2022    Hemoglobin A1c (Prediabetes) 2024    Mammogram 2024    Cervical Cancer Screening 2026    Lipid Panel 2028    TETANUS VACCINE 2029              ROS:  CONSTITUTIONAL: Denies weight change,  fatigue, fevers, chills, night sweats.  CARDIOVASCULAR: Denies chest pain, shortness of breath, orthopnea and edema.  RESPIRATORY: Denies cough, hemoptysis, dyspnea, and wheezing.  GI: See HPI.    Medical History:   Past Medical History:   Diagnosis Date    Contraceptive management     Multinodular goiter        Surgical History:   Past Surgical History:   Procedure Laterality Date     SECTION, LOW TRANSVERSE      x 1    THYROIDECTOMY  2019       Family History:   Family History   Problem Relation Age of Onset    Hypertension Mother     Diabetes Mother     Heart attacks under age 50 Father     Hypertension Father     Coronary artery disease Father     No Known Problems Brother     Cancer Maternal Grandmother     Cancer Maternal Grandfather     Lung cancer Paternal Grandfather         smoker    Thyroid disease Neg Hx     Stroke Neg Hx        Social History:   Social History     Tobacco Use    Smoking status: Never    Smokeless tobacco: Never   Substance Use Topics     Alcohol use: Yes     Comment: occasionally    Drug use: Never       Allergies:   Review of patient's allergies indicates:  No Known Allergies    Medications:   No current facility-administered medications on file prior to encounter.     Current Outpatient Medications on File Prior to Encounter   Medication Sig Dispense Refill    amLODIPine (NORVASC) 5 MG tablet Take 1 tablet (5 mg total) by mouth once daily. 90 tablet 3    hydroCHLOROthiazide (HYDRODIURIL) 25 MG tablet Take 1 tablet (25 mg total) by mouth once daily. 90 tablet 3    levothyroxine (SYNTHROID) 137 MCG Tab tablet Take 137 mcg by mouth before breakfast.      norethindrone-e.estradioL-iron (LO LOESTRIN FE) 1 mg-10 mcg (24)/10 mcg (2) Tab Take 1 tablet by mouth.      metFORMIN (GLUCOPHAGE) 500 MG tablet Take 500 mg by mouth 2 (two) times daily.         Physical Exam:  Vital Signs:   Vitals:    07/03/23 1040   BP: (!) 142/72   Pulse: 91   Resp: 16   Temp: 96.5 °F (35.8 °C)     General Appearance: Well appearing in no acute distress  ENT: OP clear  Chest: CTA B  CV: RRR, no m/r/g  Abd: s/nt/nd/nabs  Ext: no edema    Labs:Reviewed    IMP:  Active Hospital Problems    Diagnosis  POA    *Encounter for colorectal cancer screening [Z12.11, Z12.12]  Not Applicable      Resolved Hospital Problems   No resolved problems to display.         Plan:   I have explained the risks and benefits of colonoscopy to the patient including but not limited to bleeding, perforation, infection, and death. The patient wishes to proceed.

## 2023-07-07 LAB
FINAL PATHOLOGIC DIAGNOSIS: NORMAL
Lab: NORMAL

## 2023-12-12 ENCOUNTER — HOSPITAL ENCOUNTER (OUTPATIENT)
Dept: RADIOLOGY | Facility: HOSPITAL | Age: 48
Discharge: HOME | End: 2023-12-12
Attending: FAMILY MEDICINE
Payer: COMMERCIAL

## 2023-12-12 ENCOUNTER — OFFICE VISIT (OUTPATIENT)
Dept: FAMILY MEDICINE | Facility: CLINIC | Age: 48
End: 2023-12-12
Attending: FAMILY MEDICINE
Payer: COMMERCIAL

## 2023-12-12 VITALS
WEIGHT: 283.5 LBS | BODY MASS INDEX: 44.49 KG/M2 | HEART RATE: 88 BPM | HEIGHT: 67 IN | OXYGEN SATURATION: 98 % | RESPIRATION RATE: 18 BRPM | TEMPERATURE: 98 F | SYSTOLIC BLOOD PRESSURE: 138 MMHG | DIASTOLIC BLOOD PRESSURE: 86 MMHG

## 2023-12-12 DIAGNOSIS — E89.0 S/P THYROIDECTOMY: ICD-10-CM

## 2023-12-12 DIAGNOSIS — E89.0 POSTSURGICAL HYPOTHYROIDISM: ICD-10-CM

## 2023-12-12 DIAGNOSIS — G89.29 CHRONIC THUMB PAIN, RIGHT: ICD-10-CM

## 2023-12-12 DIAGNOSIS — E11.9 TYPE 2 DIABETES MELLITUS WITHOUT COMPLICATION, WITHOUT LONG-TERM CURRENT USE OF INSULIN: ICD-10-CM

## 2023-12-12 DIAGNOSIS — I10 HYPERTENSION, UNSPECIFIED TYPE: Primary | ICD-10-CM

## 2023-12-12 DIAGNOSIS — E66.01 MORBID OBESITY WITH BMI OF 40.0-44.9, ADULT: ICD-10-CM

## 2023-12-12 DIAGNOSIS — M79.644 CHRONIC THUMB PAIN, RIGHT: ICD-10-CM

## 2023-12-12 DIAGNOSIS — Z90.710 S/P HYSTERECTOMY: ICD-10-CM

## 2023-12-12 PROBLEM — R73.03 PREDIABETES: Status: RESOLVED | Noted: 2023-06-08 | Resolved: 2023-12-12

## 2023-12-12 PROCEDURE — 3079F DIAST BP 80-89 MM HG: CPT | Mod: CPTII,S$GLB,, | Performed by: FAMILY MEDICINE

## 2023-12-12 PROCEDURE — 73130 X-RAY EXAM OF HAND: CPT | Mod: TC,FY,PO,RT

## 2023-12-12 PROCEDURE — 73130 X-RAY EXAM OF HAND: CPT | Mod: 26,RT,, | Performed by: STUDENT IN AN ORGANIZED HEALTH CARE EDUCATION/TRAINING PROGRAM

## 2023-12-12 PROCEDURE — 3008F PR BODY MASS INDEX (BMI) DOCUMENTED: ICD-10-PCS | Mod: CPTII,S$GLB,, | Performed by: FAMILY MEDICINE

## 2023-12-12 PROCEDURE — 99214 OFFICE O/P EST MOD 30 MIN: CPT | Mod: S$GLB,,, | Performed by: FAMILY MEDICINE

## 2023-12-12 PROCEDURE — 99999 PR PBB SHADOW E&M-EST. PATIENT-LVL V: CPT | Mod: PBBFAC,,, | Performed by: FAMILY MEDICINE

## 2023-12-12 PROCEDURE — 99214 PR OFFICE/OUTPT VISIT, EST, LEVL IV, 30-39 MIN: ICD-10-PCS | Mod: S$GLB,,, | Performed by: FAMILY MEDICINE

## 2023-12-12 PROCEDURE — 3075F SYST BP GE 130 - 139MM HG: CPT | Mod: CPTII,S$GLB,, | Performed by: FAMILY MEDICINE

## 2023-12-12 PROCEDURE — 1160F PR REVIEW ALL MEDS BY PRESCRIBER/CLIN PHARMACIST DOCUMENTED: ICD-10-PCS | Mod: CPTII,S$GLB,, | Performed by: FAMILY MEDICINE

## 2023-12-12 PROCEDURE — 3044F PR MOST RECENT HEMOGLOBIN A1C LEVEL <7.0%: ICD-10-PCS | Mod: CPTII,S$GLB,, | Performed by: FAMILY MEDICINE

## 2023-12-12 PROCEDURE — 3075F PR MOST RECENT SYSTOLIC BLOOD PRESS GE 130-139MM HG: ICD-10-PCS | Mod: CPTII,S$GLB,, | Performed by: FAMILY MEDICINE

## 2023-12-12 PROCEDURE — 3079F PR MOST RECENT DIASTOLIC BLOOD PRESSURE 80-89 MM HG: ICD-10-PCS | Mod: CPTII,S$GLB,, | Performed by: FAMILY MEDICINE

## 2023-12-12 PROCEDURE — 99999 PR PBB SHADOW E&M-EST. PATIENT-LVL V: ICD-10-PCS | Mod: PBBFAC,,, | Performed by: FAMILY MEDICINE

## 2023-12-12 PROCEDURE — 3044F HG A1C LEVEL LT 7.0%: CPT | Mod: CPTII,S$GLB,, | Performed by: FAMILY MEDICINE

## 2023-12-12 PROCEDURE — 73130 XR HAND COMPLETE 3 VIEW RIGHT: ICD-10-PCS | Mod: 26,RT,, | Performed by: STUDENT IN AN ORGANIZED HEALTH CARE EDUCATION/TRAINING PROGRAM

## 2023-12-12 PROCEDURE — 3008F BODY MASS INDEX DOCD: CPT | Mod: CPTII,S$GLB,, | Performed by: FAMILY MEDICINE

## 2023-12-12 PROCEDURE — 1159F MED LIST DOCD IN RCRD: CPT | Mod: CPTII,S$GLB,, | Performed by: FAMILY MEDICINE

## 2023-12-12 PROCEDURE — 1160F RVW MEDS BY RX/DR IN RCRD: CPT | Mod: CPTII,S$GLB,, | Performed by: FAMILY MEDICINE

## 2023-12-12 PROCEDURE — 1159F PR MEDICATION LIST DOCUMENTED IN MEDICAL RECORD: ICD-10-PCS | Mod: CPTII,S$GLB,, | Performed by: FAMILY MEDICINE

## 2023-12-12 RX ORDER — TIRZEPATIDE 2.5 MG/.5ML
2.5 INJECTION, SOLUTION SUBCUTANEOUS
COMMUNITY

## 2023-12-12 NOTE — PROGRESS NOTES
Arielle Paige    Chief Complaint   Patient presents with    Hypertension    Follow-up       History of Present Illness:   Ms. Paige comes in today for 6-month hypertension follow-up.  She states she is not fasting and has taken medications today.  She states she does not monitor her diet but is active and occasionally walks.    She saw Dr. Gabino Bermudez, endocrinologist, on December 7, 2023 for surveillance of postsurgical hypothyroidism, hypertension, unspecified type , morbid obesity, type 2 diabetes mellitus without complication, without long-term current use of insulin   at which time she states he added Mounjaro for diabetes treatment and she states she will see nutritionist 3 days from now.  She states her blood pressure was good during her visit with Dr. Bermudez.    She states she had partial hysterectomy on September 29, 2023 due to fibroids and performed by gyn Dr. Lisa Davis.    She complains of having intermittent pain at her right thumb for some time.  She states she had splitting of her nail at her thumb years ago.  She states she now notices a white line at her nail.    Otherwise, she denies having fever, chills, fatigue, appetite changes; shortness of breath, cough, wheezing; chest pain, palpitations, leg swelling; abdominal pain, nausea, vomiting, diarrhea, constipation; unusual urinary symptoms; polydipsia, polyphagia, polyuria, hot or cold intolerance; back pain; acute visual changes, numbness, headache; anxiety, depression, homicidal or suicidal thoughts.           Labs:                      WBC                      9.23                06/08/2023                 HGB                      12.1                06/08/2023                 HCT                      36.8 (L)            06/08/2023                 PLT                      425                 06/08/2023                 CHOL                     166                 06/21/2021                 TRIG                     91                   06/21/2021                 HDL                      48                  06/21/2021                 ALT                      17                  06/21/2021                 AST                      16                  06/21/2021                 NA                       141                 06/21/2021                 K                        3.4 (L)             06/21/2021                 CL                       101                 06/21/2021                 CREATININE               0.8                 06/21/2021                 BUN                      10                  06/21/2021                 CO2                      28                  06/21/2021                 TSH                      0.14 (L)            11/28/2023                 HGBA1C                   6.8 (H)             11/28/2023             LDLCALC                  99.8                06/21/2021                  Current Outpatient Medications   Medication Sig    amLODIPine (NORVASC) 5 MG tablet Take 1 tablet (5 mg total) by mouth once daily.    hydroCHLOROthiazide (HYDRODIURIL) 25 MG tablet Take 1 tablet (25 mg total) by mouth once daily.    levothyroxine (SYNTHROID) 137 MCG Tab tablet Take 137 mcg by mouth before breakfast.    tirzepatide (MOUNJARO) 2.5 mg/0.5 mL PnIj Inject 2.5 mg into the skin every 7 days.         Review of Systems   Constitutional:  Negative for activity change, appetite change, chills, fatigue and fever.        Weight 121.9 kg (268 lb 11.9 oz) at June 8, 2023 visit.   Eyes:  Negative for visual disturbance.   Respiratory:  Negative for cough, shortness of breath and wheezing.    Cardiovascular:  Negative for chest pain, palpitations and leg swelling.        See history of present illness.   Gastrointestinal:  Negative for abdominal pain, constipation, diarrhea, nausea and vomiting.   Endocrine: Negative for cold intolerance, heat intolerance, polydipsia, polyphagia and polyuria.        See history of present illness.    Genitourinary:  Negative for difficulty urinating.        See history of present illness.   Musculoskeletal:  Positive for myalgias. Negative for arthralgias and back pain.        See history of present illness.   Skin:         See history of present illness.   Neurological:  Negative for numbness and headaches.   Psychiatric/Behavioral:  Negative for dysphoric mood and suicidal ideas. The patient is not nervous/anxious.         Negative for homicidal ideas.       Objective:  Physical Exam  Vitals reviewed.   Constitutional:       General: She is not in acute distress.     Appearance: Normal appearance. She is well-developed. She is obese. She is not ill-appearing, toxic-appearing or diaphoretic.      Comments: Pleasant.   Eyes:      Comments: She wears glasses.   Neck:      Thyroid: No thyromegaly.      Comments: Thyroid absent.  Cardiovascular:      Rate and Rhythm: Normal rate and regular rhythm.      Heart sounds: Normal heart sounds. No murmur heard.  Pulmonary:      Effort: Pulmonary effort is normal. No respiratory distress.      Breath sounds: Normal breath sounds. No stridor. No wheezing.   Chest:      Chest wall: No tenderness.   Abdominal:      General: Bowel sounds are normal. There is no distension.      Palpations: Abdomen is soft. There is no mass.      Tenderness: There is no abdominal tenderness. There is no guarding or rebound.   Musculoskeletal:         General: Tenderness present. No swelling. Normal range of motion.      Cervical back: Normal range of motion and neck supple. No tenderness.      Comments: She is ambulatory without problems. Subtle tenderness at right thumb without swelling but with full range of motion noted.   Lymphadenopathy:      Cervical: No cervical adenopathy.   Skin:     Comments: Subtle deformity of right thumb nail with slight white streak (benign-appearing).   Neurological:      General: No focal deficit present.      Mental Status: She is alert and oriented to  person, place, and time.   Psychiatric:         Mood and Affect: Mood normal.         Behavior: Behavior normal.         Thought Content: Thought content normal.         Judgment: Judgment normal.         ASSESSMENT:  1. Hypertension, unspecified type    2. Chronic thumb pain, right    3. Postsurgical hypothyroidism    4. S/P thyroidectomy    5. Type 2 diabetes mellitus without complication, without long-term current use of insulin    6. S/P hysterectomy    7. Morbid obesity with BMI of 40.0-44.9, adult        PLAN:  Arielle was seen today for hypertension and follow-up.    Diagnoses and all orders for this visit:    Hypertension, unspecified type    Chronic thumb pain, right  -     X-Ray Hand Complete Right; Future    Postsurgical hypothyroidism    S/P thyroidectomy    Type 2 diabetes mellitus without complication, without long-term current use of insulin    S/P hysterectomy    Morbid obesity with BMI of 40.0-44.9, adult      Patient advised to call for results.  Continue current medications, follow low sodium, low cholesterol, low carb diet, daily walks.  Advised patient to monitor nail discoloration as should grow out as nail grows.  However, if no change noted, dermatology consultation advised.  Keep follow up with specialists.  Follow up in about 6 months (around 6/12/2024) for physical.

## 2024-03-06 DIAGNOSIS — E11.9 TYPE 2 DIABETES MELLITUS WITHOUT COMPLICATION, WITHOUT LONG-TERM CURRENT USE OF INSULIN: ICD-10-CM

## 2024-04-10 LAB
LEFT EYE DM RETINOPATHY: NEGATIVE
RIGHT EYE DM RETINOPATHY: NEGATIVE

## 2024-04-29 ENCOUNTER — PATIENT OUTREACH (OUTPATIENT)
Dept: ADMINISTRATIVE | Facility: HOSPITAL | Age: 49
End: 2024-04-29
Payer: COMMERCIAL

## 2024-05-02 ENCOUNTER — TELEPHONE (OUTPATIENT)
Dept: PHARMACY | Facility: CLINIC | Age: 49
End: 2024-05-02
Payer: COMMERCIAL

## 2024-06-19 DIAGNOSIS — Z12.31 OTHER SCREENING MAMMOGRAM: ICD-10-CM

## 2024-06-25 ENCOUNTER — TELEPHONE (OUTPATIENT)
Dept: FAMILY MEDICINE | Facility: CLINIC | Age: 49
End: 2024-06-25
Payer: COMMERCIAL

## 2024-06-25 ENCOUNTER — OFFICE VISIT (OUTPATIENT)
Dept: FAMILY MEDICINE | Facility: CLINIC | Age: 49
End: 2024-06-25
Attending: FAMILY MEDICINE
Payer: COMMERCIAL

## 2024-06-25 ENCOUNTER — TELEPHONE (OUTPATIENT)
Dept: FAMILY MEDICINE | Facility: CLINIC | Age: 49
End: 2024-06-25

## 2024-06-25 VITALS
WEIGHT: 227.5 LBS | BODY MASS INDEX: 35.71 KG/M2 | DIASTOLIC BLOOD PRESSURE: 78 MMHG | HEART RATE: 88 BPM | OXYGEN SATURATION: 98 % | TEMPERATURE: 99 F | SYSTOLIC BLOOD PRESSURE: 126 MMHG | HEIGHT: 67 IN

## 2024-06-25 DIAGNOSIS — Z53.21 PATIENT LEFT BEFORE EVALUATION BY PHYSICIAN: Primary | ICD-10-CM

## 2024-06-25 PROCEDURE — 99499 UNLISTED E&M SERVICE: CPT | Mod: S$GLB,,, | Performed by: FAMILY MEDICINE

## 2024-06-25 PROCEDURE — 99999 PR PBB SHADOW E&M-EST. PATIENT-LVL III: CPT | Mod: PBBFAC,,, | Performed by: FAMILY MEDICINE

## 2024-06-25 NOTE — PROGRESS NOTES
The patient left the office before seen by provider.  Provider reached out to patient by phone; patient stated she will call back to reschedule as she had to make it to another appointment.

## 2024-08-25 DIAGNOSIS — I10 HYPERTENSION, UNSPECIFIED TYPE: ICD-10-CM

## 2024-08-25 RX ORDER — AMLODIPINE BESYLATE 5 MG/1
5 TABLET ORAL
Qty: 90 TABLET | Refills: 3 | Status: SHIPPED | OUTPATIENT
Start: 2024-08-25

## 2024-08-25 NOTE — TELEPHONE ENCOUNTER
Refill Routing Note   Medication(s) are not appropriate for processing by [facility] for the following reason(s):        Required labs outdated    [facility] action(s):  Defer  Approve     Requires labs : Yes             Appointments  past 12m or future 3m with PCP    Date Provider   Last Visit   6/25/2024 Marisa West MD   Next Visit   2/6/2025 Marisa West MD   ED visits in past 90 days: 0        Note composed:7:46 AM 08/25/2024

## 2024-08-26 RX ORDER — HYDROCHLOROTHIAZIDE 25 MG/1
25 TABLET ORAL
Qty: 90 TABLET | Refills: 0 | Status: SHIPPED | OUTPATIENT
Start: 2024-08-26

## 2024-11-20 DIAGNOSIS — I10 HYPERTENSION, UNSPECIFIED TYPE: ICD-10-CM

## 2024-11-20 RX ORDER — HYDROCHLOROTHIAZIDE 25 MG/1
25 TABLET ORAL
Qty: 90 TABLET | Refills: 2 | Status: SHIPPED | OUTPATIENT
Start: 2024-11-20

## 2024-11-20 NOTE — TELEPHONE ENCOUNTER
Care Due:                  Date            Visit Type   Department     Provider  --------------------------------------------------------------------------------                                EP -                              PRIMARY      [facility]  Last Visit: 06-      CARE ([facility])   MEDICINE       Marisa West                              MYCHART                              FOLLOWUP/OF  [facility]  Next Visit: 02-      [facility] VISIT   MEDICINE       Marisa West                                                            Last  Test          Frequency    Reason                     Performed    Due Date  --------------------------------------------------------------------------------    CMP.........  12 months..  hydroCHLOROthiazide......  Not Found    Overdue    [employer] Embedded Care Due Messages. Reference number: [id number].   11/20/2024 7:31:29 AM CST

## 2024-11-20 NOTE — TELEPHONE ENCOUNTER
Refill Routing Note   Medication(s) are not appropriate for processing by [facility] for the following reason(s):        Required labs outdated    [facility] action(s):  Defer   Requires labs : Yes             Appointments  past 12m or future 3m with PCP    Date Provider   Last Visit   6/25/2024 Marisa West MD   Next Visit   2/6/2025 Marisa West MD   ED visits in past 90 days: 0        Note composed:7:44 AM 11/20/2024

## 2024-12-09 ENCOUNTER — LAB VISIT (OUTPATIENT)
Dept: LAB | Facility: HOSPITAL | Age: 49
End: 2024-12-09
Attending: FAMILY MEDICINE
Payer: COMMERCIAL

## 2024-12-09 ENCOUNTER — OFFICE VISIT (OUTPATIENT)
Dept: FAMILY MEDICINE | Facility: CLINIC | Age: 49
End: 2024-12-09
Attending: FAMILY MEDICINE
Payer: COMMERCIAL

## 2024-12-09 VITALS
SYSTOLIC BLOOD PRESSURE: 126 MMHG | RESPIRATION RATE: 18 BRPM | DIASTOLIC BLOOD PRESSURE: 88 MMHG | BODY MASS INDEX: 35.02 KG/M2 | HEIGHT: 67 IN | WEIGHT: 223.13 LBS | OXYGEN SATURATION: 99 % | TEMPERATURE: 98 F | HEART RATE: 83 BPM

## 2024-12-09 DIAGNOSIS — I10 HYPERTENSION, UNSPECIFIED TYPE: ICD-10-CM

## 2024-12-09 DIAGNOSIS — E11.9 TYPE 2 DIABETES MELLITUS WITHOUT COMPLICATION, WITHOUT LONG-TERM CURRENT USE OF INSULIN: ICD-10-CM

## 2024-12-09 DIAGNOSIS — Z90.710 S/P HYSTERECTOMY: ICD-10-CM

## 2024-12-09 DIAGNOSIS — Z00.00 ANNUAL PHYSICAL EXAM: ICD-10-CM

## 2024-12-09 DIAGNOSIS — E89.0 POSTSURGICAL HYPOTHYROIDISM: ICD-10-CM

## 2024-12-09 DIAGNOSIS — Z23 NEED FOR VACCINATION: ICD-10-CM

## 2024-12-09 DIAGNOSIS — E66.811 OBESITY (BMI 30.0-34.9): ICD-10-CM

## 2024-12-09 DIAGNOSIS — E89.0 S/P THYROIDECTOMY: ICD-10-CM

## 2024-12-09 LAB
BASOPHILS # BLD AUTO: 0.04 K/UL (ref 0–0.2)
BASOPHILS NFR BLD: 0.8 % (ref 0–1.9)
DIFFERENTIAL METHOD BLD: ABNORMAL
EOSINOPHIL # BLD AUTO: 0.1 K/UL (ref 0–0.5)
EOSINOPHIL NFR BLD: 2.3 % (ref 0–8)
ERYTHROCYTE [DISTWIDTH] IN BLOOD BY AUTOMATED COUNT: 14.6 % (ref 11.5–14.5)
HCT VFR BLD AUTO: 37.8 % (ref 37–48.5)
HGB BLD-MCNC: 12.8 G/DL (ref 12–16)
IMM GRANULOCYTES # BLD AUTO: 0.01 K/UL (ref 0–0.04)
IMM GRANULOCYTES NFR BLD AUTO: 0.2 % (ref 0–0.5)
LYMPHOCYTES # BLD AUTO: 2.7 K/UL (ref 1–4.8)
LYMPHOCYTES NFR BLD: 51.7 % (ref 18–48)
MCH RBC QN AUTO: 24.7 PG (ref 27–31)
MCHC RBC AUTO-ENTMCNC: 33.9 G/DL (ref 32–36)
MCV RBC AUTO: 73 FL (ref 82–98)
MONOCYTES # BLD AUTO: 0.3 K/UL (ref 0.3–1)
MONOCYTES NFR BLD: 6.5 % (ref 4–15)
NEUTROPHILS # BLD AUTO: 2 K/UL (ref 1.8–7.7)
NEUTROPHILS NFR BLD: 38.5 % (ref 38–73)
NRBC BLD-RTO: 0 /100 WBC
PLATELET # BLD AUTO: 406 K/UL (ref 150–450)
PMV BLD AUTO: 9.7 FL (ref 9.2–12.9)
RBC # BLD AUTO: 5.18 M/UL (ref 4–5.4)
WBC # BLD AUTO: 5.26 K/UL (ref 3.9–12.7)

## 2024-12-09 PROCEDURE — 85025 COMPLETE CBC W/AUTO DIFF WBC: CPT | Performed by: FAMILY MEDICINE

## 2024-12-09 PROCEDURE — 1160F RVW MEDS BY RX/DR IN RCRD: CPT | Mod: CPTII,S$GLB,, | Performed by: FAMILY MEDICINE

## 2024-12-09 PROCEDURE — 36415 COLL VENOUS BLD VENIPUNCTURE: CPT | Mod: PO | Performed by: FAMILY MEDICINE

## 2024-12-09 PROCEDURE — 99999 PR PBB SHADOW E&M-EST. PATIENT-LVL IV: CPT | Mod: PBBFAC,,, | Performed by: FAMILY MEDICINE

## 2024-12-09 PROCEDURE — 99396 PREV VISIT EST AGE 40-64: CPT | Mod: 25,S$GLB,, | Performed by: FAMILY MEDICINE

## 2024-12-09 PROCEDURE — 3079F DIAST BP 80-89 MM HG: CPT | Mod: CPTII,S$GLB,, | Performed by: FAMILY MEDICINE

## 2024-12-09 PROCEDURE — 1159F MED LIST DOCD IN RCRD: CPT | Mod: CPTII,S$GLB,, | Performed by: FAMILY MEDICINE

## 2024-12-09 PROCEDURE — 3074F SYST BP LT 130 MM HG: CPT | Mod: CPTII,S$GLB,, | Performed by: FAMILY MEDICINE

## 2024-12-09 PROCEDURE — 90656 IIV3 VACC NO PRSV 0.5 ML IM: CPT | Mod: S$GLB,,, | Performed by: FAMILY MEDICINE

## 2024-12-09 PROCEDURE — 3044F HG A1C LEVEL LT 7.0%: CPT | Mod: CPTII,S$GLB,, | Performed by: FAMILY MEDICINE

## 2024-12-09 PROCEDURE — 90471 IMMUNIZATION ADMIN: CPT | Mod: S$GLB,,, | Performed by: FAMILY MEDICINE

## 2024-12-09 PROCEDURE — 3008F BODY MASS INDEX DOCD: CPT | Mod: CPTII,S$GLB,, | Performed by: FAMILY MEDICINE

## 2024-12-09 RX ORDER — SEMAGLUTIDE 2.68 MG/ML
2 INJECTION, SOLUTION SUBCUTANEOUS
COMMUNITY
Start: 2024-12-06

## 2024-12-09 NOTE — PROGRESS NOTES
HISTORY OF PRESENT ILLNESS: Ms. Paige comes in today for annual wellness examination.  She is not fasting but has taken medications today.     END OF LIFE DECISION: She does not have a living will. She does desire life support unless brain dead.    Current Outpatient Medications   Medication Sig    amLODIPine (NORVASC) 5 MG tablet TAKE 1 TABLET BY MOUTH EVERY DAY    hydroCHLOROthiazide (HYDRODIURIL) 25 MG tablet TAKE 1 TABLET BY MOUTH EVERY DAY    levothyroxine (SYNTHROID) 137 MCG Tab tablet Take 137 mcg by mouth before breakfast.    OZEMPIC 2 mg/dose (8 mg/3 mL) PnIj Inject 2 mg into the skin every 7 days.    tirzepatide (MOUNJARO) 2.5 mg/0.5 mL PnIj Inject 2.5 mg into the skin every 7 days.     SCREENINGS:    Cholesterol: June 4, 2024.   Mammogram: June 12, 2024 with GYN Dr. Lisa Davis - benign.   Colonoscopy: July 3, 2023 - benign colon polyp; repeat in 5 years.  Dexa Scan: Never.   GYN Exam: May 2023 with GYN Dr. Lisa wilhelm.   Eye Exam: September 2024 per patient. She wears glasses.  Dental Exam: 2 months ago per patient.  PPD: Never.    Immunization History   Administered Date(s) Administered    COVID-19, MRNA, LN-S, PF (Pfizer) (Purple Cap) 03/03/2021, 03/24/2021, 12/08/2021    Influenza 10/07/2021    Influenza - Quadrivalent - PF *Preferred* (6 months and older) 09/18/2019, 10/07/2021, 10/28/2022    PPD Test 09/18/2019, 09/30/2019, 10/07/2019    Tdap 09/18/2019                           ROS:  GENERAL: Denies fever, chills, fatigue or unusual weight change. Appetite normal. Exercises 5 to 6 times per week, 40 minutes each time by treadmill, [employer] and 90 minutes with cross-training. Monitors diet does. Weight 128.6 kg (283 lb 8.2 oz) at December 12, 2023 visit.  SKIN: Denies rashes, itching, changes in mole, color or texture of skin or easy bruising.  HEAD:  Denies headaches or recent head trauma.  EYES: Denies change in vision, pain, diplopia, redness or discharge.  EARS: Denies ear pain,  "discharge, vertigo or decreased hearing.   NOSE: Denies loss of smell, epistaxis or rhinitis.  MOUTH & THROAT: Denies hoarseness or change in voice. Denies excessive gum bleeding or mouth sores.  Denies sore throat.  NODES: Denies swollen glands.  CHEST: Denies BECKWITH, wheezing, cough, or sputum production.  CARDIOVASCULAR: Denies chest pain, PND, orthopnea or reduced exercise tolerance.  Denies palpitations. Occasionally performs home blood pressure checks (once per week) with levels ranging "normal."  ABDOMEN: Denies diarrhea, constipation, nausea, vomiting, abdominal pain, or blood in stool.  URINARY: Denies flank pain, dysuria or hematuria.  GENITOURINARY: Denies flank pain, dysuria, frequency or hematuria. No change in menses. Performs monthly breast exams does.  ENDOCRINE: Denies cholesterol problems. Performs home glucose checks and states levels are good. Follows with Dr. Gabino Bermudez, endocrinologist, for surveillance of postsurgical hypothyroidism, prediabetes, morbid obesity with last visit on June 25, 2024 with 6-month follow up advised and scheduled for February 2025  HEME/LYMPH: Denies bleeding problems.  PERIPHERAL VASCULAR:Denies claudication or cyanosis.  MUSCULOSKELETAL: Denies joint stiffness, pain or swelling. Denies edema.   NEUROLOGIC: Denies history of seizures, tremors, paralysis, alteration of gait or coordination.  PSYCHIATRIC: Denies mood swings, depression, anxiety, homicidal or suicidal thoughts. Denies sleep problems.     PE:   VS: /88 (BP Location: Right arm, Patient Position: Sitting)   Pulse 83   Temp 98.2 °F (36.8 °C) (Tympanic)   Resp 18   Ht 5' 7" (1.702 m)   Wt 101.2 kg (223 lb 1.7 oz)   LMP 06/01/2023 Comment: Monthly  SpO2 99%   BMI 34.94 kg/m²   APPEARANCE:  Well nourished, well developed female, pleasant and obese, alert and oriented in no acute distress.    HEAD: Nontender. Full range of motion.  EYES: PERRL, conjunctiva pink, lids no edema. She wears " glasses.  EARS: External canals patent, no swelling or redness. TM's shiny and clear.  NOSE: Mucosa and turbinates pink, not swollen. No discharge. Nontender sinuses.  THROAT: No pharyngeal erythema or exudate. No stridor.   NECK: Supple, no mass, thyroid not enlarged.  NODES: No cervical lymph node enlargement.  CHEST: Normal respiratory effort. Lungs clear to auscultation.  CARDIOVASCULAR: Normal S1, S2. No rubs, murmurs or gallops. PMI not displaced. No carotid bruit. Pedal pulses palpable bilaterally. No edema.  ABDOMEN: Bowel sounds present. Not distended. Soft. No tenderness, masses or organomegaly.  BREAST EXAM: Not examined.  PELVIC EXAM: Not examined.  RECTAL EXAM: Not examined.  MUSCULOSKELETAL: No joint deformities or stiffness. She is ambulatory without problems.   SKIN: No rashes or suspicious lesions, normal color and turgor. Well-healed surgical scar at anterior neck noted.  NEUROLOGIC:   Cranial Nerves: II-XII grossly intact.   DTR's: Knees, Ankles 2+ and equal bilaterally. Gait & Posture: Normal gait and fine motion.  PSYCHIATRIC:Patient alert, oriented x 3. Mood/Affect normal without acute anxiety or depression noted. Judgment/insight good as she makes appropriate decisions during today's exam.    Protective Sensation (w/ 10 gram monofilament):  Right: Intact  Left: Intact    Visual Inspection:  Normal -  Bilateral    Pedal Pulses:   Right: Present  Left: Present    Posterior Tibialis Pulses:   Right:Present  Left: Present     ASSESSMENT:    ICD-10-CM ICD-9-CM    1. Annual physical exam  Z00.00 V70.0 CBC W/ AUTO DIFFERENTIAL      2. Hypertension, unspecified type  I10 401.9       3. Type 2 diabetes mellitus without complication, without long-term current use of insulin  E11.9 250.00       4. Postsurgical hypothyroidism  E89.0 244.0       5. S/P thyroidectomy  E89.0 246.8       6. S/P hysterectomy  Z90.710 V88.01       7. Obesity (BMI 30.0-34.9)  E66.811 278.00       8. Need for vaccination  Z23  V05.9 Influenza - Trivalent - PF (ADULT)        PLAN:  1. Age-appropriate counseling-appropriate low-sodium, low-cholesterol, low carbohydrate diet and exercise daily, monthly breast self exam, annual wellness examination.   2. Patient advised to call for results.  3. Continue current medications.  4. Annual eye and dental examinations advised.  5. Keep follow up with specialists.  6. Flu shot will be given today.  7. Follow up in about 6 months (around 6/10/2025).

## 2024-12-18 PROBLEM — E89.0 POSTSURGICAL HYPOTHYROIDISM: Status: RESOLVED | Noted: 2023-06-08 | Resolved: 2024-12-18

## 2024-12-18 PROBLEM — E66.811 OBESITY (BMI 30.0-34.9): Status: ACTIVE | Noted: 2024-12-18

## 2024-12-18 PROBLEM — E66.01 MORBID OBESITY WITH BMI OF 40.0-44.9, ADULT: Status: RESOLVED | Noted: 2020-01-02 | Resolved: 2024-12-18

## 2024-12-18 PROBLEM — Z98.890 S/P THYROIDECTOMY: Status: RESOLVED | Noted: 2019-07-01 | Resolved: 2024-12-18

## 2024-12-18 PROBLEM — Z90.89 S/P THYROIDECTOMY: Status: RESOLVED | Noted: 2019-07-01 | Resolved: 2024-12-18

## 2024-12-18 PROBLEM — E89.0 S/P THYROIDECTOMY: Status: RESOLVED | Noted: 2019-07-01 | Resolved: 2024-12-18

## 2025-01-08 DIAGNOSIS — E11.9 TYPE 2 DIABETES MELLITUS WITHOUT COMPLICATION: ICD-10-CM

## 2025-03-24 ENCOUNTER — PATIENT MESSAGE (OUTPATIENT)
Dept: ADMINISTRATIVE | Facility: HOSPITAL | Age: 50
End: 2025-03-24
Payer: COMMERCIAL

## 2025-06-25 DIAGNOSIS — E11.9 TYPE 2 DIABETES MELLITUS WITHOUT COMPLICATION, WITHOUT LONG-TERM CURRENT USE OF INSULIN: ICD-10-CM

## 2025-08-04 ENCOUNTER — OFFICE VISIT (OUTPATIENT)
Dept: FAMILY MEDICINE | Facility: CLINIC | Age: 50
End: 2025-08-04
Attending: FAMILY MEDICINE
Payer: COMMERCIAL

## 2025-08-04 VITALS
HEIGHT: 67 IN | SYSTOLIC BLOOD PRESSURE: 124 MMHG | OXYGEN SATURATION: 98 % | BODY MASS INDEX: 34.84 KG/M2 | DIASTOLIC BLOOD PRESSURE: 78 MMHG | TEMPERATURE: 97 F | WEIGHT: 222 LBS | HEART RATE: 78 BPM | RESPIRATION RATE: 18 BRPM

## 2025-08-04 DIAGNOSIS — E11.9 TYPE 2 DIABETES MELLITUS WITHOUT COMPLICATION, WITHOUT LONG-TERM CURRENT USE OF INSULIN: ICD-10-CM

## 2025-08-04 DIAGNOSIS — E66.811 OBESITY (BMI 30.0-34.9): ICD-10-CM

## 2025-08-04 DIAGNOSIS — I10 HYPERTENSION, UNSPECIFIED TYPE: Primary | ICD-10-CM

## 2025-08-04 PROCEDURE — 3074F SYST BP LT 130 MM HG: CPT | Mod: CPTII,S$GLB,, | Performed by: FAMILY MEDICINE

## 2025-08-04 PROCEDURE — 1159F MED LIST DOCD IN RCRD: CPT | Mod: CPTII,S$GLB,, | Performed by: FAMILY MEDICINE

## 2025-08-04 PROCEDURE — 3008F BODY MASS INDEX DOCD: CPT | Mod: CPTII,S$GLB,, | Performed by: FAMILY MEDICINE

## 2025-08-04 PROCEDURE — 1160F RVW MEDS BY RX/DR IN RCRD: CPT | Mod: CPTII,S$GLB,, | Performed by: FAMILY MEDICINE

## 2025-08-04 PROCEDURE — 3078F DIAST BP <80 MM HG: CPT | Mod: CPTII,S$GLB,, | Performed by: FAMILY MEDICINE

## 2025-08-04 PROCEDURE — 3044F HG A1C LEVEL LT 7.0%: CPT | Mod: CPTII,S$GLB,, | Performed by: FAMILY MEDICINE

## 2025-08-04 PROCEDURE — G2211 COMPLEX E/M VISIT ADD ON: HCPCS | Mod: S$GLB,,, | Performed by: FAMILY MEDICINE

## 2025-08-04 PROCEDURE — 99214 OFFICE O/P EST MOD 30 MIN: CPT | Mod: S$GLB,,, | Performed by: FAMILY MEDICINE

## 2025-08-04 PROCEDURE — 99999 PR PBB SHADOW E&M-EST. PATIENT-LVL IV: CPT | Mod: PBBFAC,,, | Performed by: FAMILY MEDICINE

## 2025-08-04 RX ORDER — HYDROCHLOROTHIAZIDE 25 MG/1
25 TABLET ORAL DAILY
Qty: 90 TABLET | Refills: 3 | Status: SHIPPED | OUTPATIENT
Start: 2025-08-04

## 2025-08-04 RX ORDER — AMLODIPINE BESYLATE 5 MG/1
5 TABLET ORAL DAILY
Qty: 90 TABLET | Refills: 3 | Status: SHIPPED | OUTPATIENT
Start: 2025-08-04